# Patient Record
Sex: MALE | Race: WHITE | URBAN - METROPOLITAN AREA
[De-identification: names, ages, dates, MRNs, and addresses within clinical notes are randomized per-mention and may not be internally consistent; named-entity substitution may affect disease eponyms.]

---

## 2020-07-09 ENCOUNTER — TELEPHONE ENCOUNTER (OUTPATIENT)
Dept: URBAN - METROPOLITAN AREA CLINIC 23 | Facility: CLINIC | Age: 25
End: 2020-07-09

## 2020-09-03 ENCOUNTER — OFFICE VISIT (OUTPATIENT)
Dept: URBAN - METROPOLITAN AREA CLINIC 78 | Facility: CLINIC | Age: 25
End: 2020-09-03
Payer: COMMERCIAL

## 2020-09-03 DIAGNOSIS — K51.90 ULCERATIVE COLITIS: ICD-10-CM

## 2020-09-03 DIAGNOSIS — R12 HEARTBURN: ICD-10-CM

## 2020-09-03 PROCEDURE — G8417 CALC BMI ABV UP PARAM F/U: HCPCS | Performed by: INTERNAL MEDICINE

## 2020-09-03 PROCEDURE — G9903 PT SCRN TBCO ID AS NON USER: HCPCS | Performed by: INTERNAL MEDICINE

## 2020-09-03 PROCEDURE — 99214 OFFICE O/P EST MOD 30 MIN: CPT | Performed by: INTERNAL MEDICINE

## 2020-09-03 PROCEDURE — G8427 DOCREV CUR MEDS BY ELIG CLIN: HCPCS | Performed by: INTERNAL MEDICINE

## 2020-09-03 RX ORDER — SODIUM, POTASSIUM,MAG SULFATES 17.5-3.13G
354 ML SOLUTION, RECONSTITUTED, ORAL ORAL
Qty: 1 | Refills: 0 | OUTPATIENT
Start: 2020-09-03

## 2020-09-03 RX ORDER — PREDNISONE 10 MG/1
TAKE 4 TABLET BY ORAL ROUTE QD FOR 5 DAYS, THEN 3 TABLETS DAILY FOR 7 DAYS, THEN 2 TABLETS DAILY FOR 7 DAYS, THEN 1 TABLET DAILY FOR 4 DAYS THEN 0.5 TABLETS DAILY FOR 3 DAYS, THEN STOP TABLET ORAL 1
Qty: 70 | Refills: 0 | Status: ON HOLD | COMMUNITY
Start: 2019-03-26

## 2020-09-03 RX ORDER — MESALAMINE 1.2 G/1
2 TABLETS TABLET, DELAYED RELEASE ORAL ONCE A DAY
Qty: 180 | Refills: 2 | OUTPATIENT
Start: 2020-09-03 | End: 2020-12-01

## 2020-09-03 RX ORDER — MESALAMINE 1.2 G/1
TAKE 4 TABLETS (4.8 GRAM) BY ORAL ROUTE ONCE DAILY WITH A MEAL TABLET, DELAYED RELEASE ORAL 1
Qty: 360 | Refills: 3 | Status: ACTIVE | COMMUNITY
Start: 2020-01-06 | End: 2020-12-31

## 2020-09-03 RX ORDER — METHYLPREDNISOLONE 4 MG
TAKE AS DIRECTED FOR 6 DAYS TABLET, DOSE PACK ORAL
Qty: 1 | Refills: 0 | Status: ON HOLD | COMMUNITY
Start: 2018-02-15

## 2020-09-03 RX ORDER — OMEPRAZOLE 40 MG/1
TAKE 1 CAPSULE (40 MG) BY ORAL ROUTE ONCE DAILY 30 MINUTES BEFORE BREAKFAST CAPSULE, DELAYED RELEASE PELLETS ORAL 1
Qty: 30 | Refills: 4 | Status: ACTIVE | COMMUNITY
Start: 2019-10-16

## 2020-09-03 RX ORDER — MESALAMINE 1.2 G/1
TAKE 4 TABLETS (4.8 GRAM) BY ORAL ROUTE ONCE DAILY WITH A MEAL TABLET, DELAYED RELEASE ORAL 1
Qty: 120 | Refills: 6 | Status: ACTIVE | COMMUNITY
Start: 2018-07-10

## 2020-11-04 ENCOUNTER — CLAIMS CREATED FROM THE CLAIM WINDOW (OUTPATIENT)
Dept: URBAN - METROPOLITAN AREA CLINIC 4 | Facility: CLINIC | Age: 25
End: 2020-11-04
Payer: COMMERCIAL

## 2020-11-04 ENCOUNTER — OFFICE VISIT (OUTPATIENT)
Dept: URBAN - METROPOLITAN AREA SURGERY CENTER 15 | Facility: SURGERY CENTER | Age: 25
End: 2020-11-04
Payer: COMMERCIAL

## 2020-11-04 DIAGNOSIS — K51.80 OTHER ULCERATIVE COLITIS WITHOUT COMPLICATIONS: ICD-10-CM

## 2020-11-04 DIAGNOSIS — K51.00 CHRONIC PANCOLONIC ULCERATIVE COLITIS: ICD-10-CM

## 2020-11-04 DIAGNOSIS — K62.89 ANAL BURNING: ICD-10-CM

## 2020-11-04 DIAGNOSIS — K51.40 INFLAMMATORY POLYPS OF COLON WITHOUT COMPLICATIONS: ICD-10-CM

## 2020-11-04 PROCEDURE — 45380 COLONOSCOPY AND BIOPSY: CPT | Performed by: INTERNAL MEDICINE

## 2020-11-04 PROCEDURE — G8907 PT DOC NO EVENTS ON DISCHARG: HCPCS | Performed by: INTERNAL MEDICINE

## 2020-11-04 PROCEDURE — 88305 TISSUE EXAM BY PATHOLOGIST: CPT | Performed by: PATHOLOGY

## 2020-11-04 PROCEDURE — G9937 DIG OR SURV COLSCO: HCPCS | Performed by: INTERNAL MEDICINE

## 2020-11-04 RX ORDER — METHYLPREDNISOLONE 4 MG
TAKE AS DIRECTED FOR 6 DAYS TABLET, DOSE PACK ORAL
Qty: 1 | Refills: 0 | Status: ON HOLD | COMMUNITY
Start: 2018-02-15

## 2020-11-04 RX ORDER — PREDNISONE 10 MG/1
TAKE 4 TABLET BY ORAL ROUTE QD FOR 5 DAYS, THEN 3 TABLETS DAILY FOR 7 DAYS, THEN 2 TABLETS DAILY FOR 7 DAYS, THEN 1 TABLET DAILY FOR 4 DAYS THEN 0.5 TABLETS DAILY FOR 3 DAYS, THEN STOP TABLET ORAL 1
Qty: 70 | Refills: 0 | Status: ON HOLD | COMMUNITY
Start: 2019-03-26

## 2020-11-04 RX ORDER — MESALAMINE 1.2 G/1
2 TABLETS TABLET, DELAYED RELEASE ORAL ONCE A DAY
Qty: 180 | Refills: 2 | Status: ACTIVE | COMMUNITY
Start: 2020-09-03 | End: 2020-12-01

## 2020-11-04 RX ORDER — MESALAMINE 1.2 G/1
TAKE 4 TABLETS (4.8 GRAM) BY ORAL ROUTE ONCE DAILY WITH A MEAL TABLET, DELAYED RELEASE ORAL 1
Qty: 360 | Refills: 3 | Status: ACTIVE | COMMUNITY
Start: 2020-01-06 | End: 2020-12-31

## 2020-11-04 RX ORDER — SODIUM, POTASSIUM,MAG SULFATES 17.5-3.13G
354 ML SOLUTION, RECONSTITUTED, ORAL ORAL
Qty: 1 | Refills: 0 | Status: ACTIVE | COMMUNITY
Start: 2020-09-03

## 2020-11-04 RX ORDER — MESALAMINE 1.2 G/1
TAKE 4 TABLETS (4.8 GRAM) BY ORAL ROUTE ONCE DAILY WITH A MEAL TABLET, DELAYED RELEASE ORAL 1
Qty: 120 | Refills: 6 | Status: ACTIVE | COMMUNITY
Start: 2018-07-10

## 2020-11-04 RX ORDER — OMEPRAZOLE 40 MG/1
TAKE 1 CAPSULE (40 MG) BY ORAL ROUTE ONCE DAILY 30 MINUTES BEFORE BREAKFAST CAPSULE, DELAYED RELEASE PELLETS ORAL 1
Qty: 30 | Refills: 4 | Status: ACTIVE | COMMUNITY
Start: 2019-10-16

## 2021-01-04 ENCOUNTER — OFFICE VISIT (OUTPATIENT)
Dept: URBAN - METROPOLITAN AREA CLINIC 78 | Facility: CLINIC | Age: 26
End: 2021-01-04
Payer: COMMERCIAL

## 2021-01-04 ENCOUNTER — LAB OUTSIDE AN ENCOUNTER (OUTPATIENT)
Dept: URBAN - METROPOLITAN AREA CLINIC 78 | Facility: CLINIC | Age: 26
End: 2021-01-04

## 2021-01-04 VITALS
SYSTOLIC BLOOD PRESSURE: 129 MMHG | HEART RATE: 78 BPM | BODY MASS INDEX: 32.89 KG/M2 | DIASTOLIC BLOOD PRESSURE: 87 MMHG | WEIGHT: 242.8 LBS | TEMPERATURE: 97.4 F | HEIGHT: 72 IN

## 2021-01-04 DIAGNOSIS — K51.90 ULCERATIVE COLITIS: ICD-10-CM

## 2021-01-04 DIAGNOSIS — R12 HEARTBURN: ICD-10-CM

## 2021-01-04 PROCEDURE — G8482 FLU IMMUNIZE ORDER/ADMIN: HCPCS | Performed by: INTERNAL MEDICINE

## 2021-01-04 PROCEDURE — 99214 OFFICE O/P EST MOD 30 MIN: CPT | Performed by: INTERNAL MEDICINE

## 2021-01-04 PROCEDURE — G8427 DOCREV CUR MEDS BY ELIG CLIN: HCPCS | Performed by: INTERNAL MEDICINE

## 2021-01-04 PROCEDURE — G9903 PT SCRN TBCO ID AS NON USER: HCPCS | Performed by: INTERNAL MEDICINE

## 2021-01-04 PROCEDURE — G8417 CALC BMI ABV UP PARAM F/U: HCPCS | Performed by: INTERNAL MEDICINE

## 2021-01-04 RX ORDER — MESALAMINE 1.2 G/1
1 CAPSULE TABLET, DELAYED RELEASE ORAL ONCE A DAY
Qty: 90 | Refills: 2 | OUTPATIENT
Start: 2021-01-04 | End: 2021-04-04

## 2021-01-04 RX ORDER — PREDNISONE 10 MG/1
TAKE 4 TABLET BY ORAL ROUTE QD FOR 5 DAYS, THEN 3 TABLETS DAILY FOR 7 DAYS, THEN 2 TABLETS DAILY FOR 7 DAYS, THEN 1 TABLET DAILY FOR 4 DAYS THEN 0.5 TABLETS DAILY FOR 3 DAYS, THEN STOP TABLET ORAL 1
Qty: 70 | Refills: 0 | Status: ON HOLD | COMMUNITY
Start: 2019-03-26

## 2021-01-04 RX ORDER — OMEPRAZOLE 40 MG/1
TAKE 1 CAPSULE (40 MG) BY ORAL ROUTE ONCE DAILY 30 MINUTES BEFORE BREAKFAST CAPSULE, DELAYED RELEASE PELLETS ORAL 1
Qty: 30 | Refills: 4 | Status: ACTIVE | COMMUNITY
Start: 2019-10-16

## 2021-01-04 RX ORDER — MESALAMINE 1.2 G/1
TAKE 4 TABLETS (4.8 GRAM) BY ORAL ROUTE ONCE DAILY WITH A MEAL TABLET, DELAYED RELEASE ORAL 1
OUTPATIENT
Start: 2018-07-10

## 2021-01-04 RX ORDER — SODIUM, POTASSIUM,MAG SULFATES 17.5-3.13G
354 ML SOLUTION, RECONSTITUTED, ORAL ORAL
Qty: 1 | Refills: 0 | Status: ACTIVE | COMMUNITY
Start: 2020-09-03

## 2021-01-04 RX ORDER — METHYLPREDNISOLONE 4 MG
TAKE AS DIRECTED FOR 6 DAYS TABLET, DOSE PACK ORAL
Qty: 1 | Refills: 0 | Status: ON HOLD | COMMUNITY
Start: 2018-02-15

## 2021-01-04 RX ORDER — MESALAMINE 1.2 G/1
TAKE 4 TABLETS (4.8 GRAM) BY ORAL ROUTE ONCE DAILY WITH A MEAL TABLET, DELAYED RELEASE ORAL 1
Qty: 120 | Refills: 6 | Status: ACTIVE | COMMUNITY
Start: 2018-07-10

## 2021-01-04 NOTE — HPI-TODAY'S VISIT:
I have been following the patient for Ulcerative colitis.  I started him on Humira on 4/12/19. He has not had any side effects. He is also on Mesalamine - now down to 3 tablets daily. He has been compliant with this. He has been off Mercaptopurine completely for a few months now.  He is having 1 soft, formed non bloody BM daily. Appetite has been good. No abdominal pain. No fevers or chills. No nausea or vomiting. His GERD has been well-controlled using Omeprazole prn. He has not been using the Citrucel anymore as he felt that he was not struggling with constipation anymore. He has been intentionally losing weight by increasing his exercise routine and avoiding eating out. He has lost 20lbs.   I had instructed him to increased the Humira injections to once weekly. He continues to feel great. Denies any new complaints or AE to the meds.    He just had bloodwork done via his PCP's office.   Summary of prior workup: - Labs on 12/3/20: Na 139, K 4.0, Cl 101, Gluc 87, BUN 15, C r 0.9, TP 7.9, Alb 4.6, TB 0.7, Ca 9.7, ALT 33, AST 21, AP 90. HbA1c 5.4%, , TSH 1.28, WBC 9.3, Hb 17.2, MCV 90, Plts 327.  - Colonoscopy by me on 11/4/20: Normal mucosa thorughout the colon except for mild patchy erythema in the sigm and rectum, as well as a small inflammatory pseudopolyp removed from the rectum. Bxs consistent with inactive colitis.   - Bloodwork on 4/18/20 revealed a glucose of 97, BUN 11, creatinine 0.8, sodium 142, potassium 4.2, calcium 9.7, TP 7.9, albumin 4.9, TB 0.7, AP slightly elevated at 123, AST 29, ALT 29. WBC 8.9, hemoglobin 17.6, MCV 89, platelets 354, % saturation 31, normal Fe, negative viral hepatitis panel, negative TB test. Vitamin D low at 17.9. B12 323, ferritin 70, CRP 2. - Bloodwork on 1/17/20 revealed an Adlimumab level of 3.2 while Adalimumab antibody was undetectable. - Colonoscopy by me on 12/11/19 revealed a normal terminal ileum and overall normal mucosa throughout the descending, splenic flexure, transverse, hepatic flexure, ascending and cecum. Biopsies were consistent with ongoing mild active colitis. There was evidence of proctosigmoiditis and a few polyps which were noted to be inflammatory and hyperplastic in nature. - Labs on 7/27/19 revealed a glucose of 96, BUN 11, creatinine 0.7, normal electrolytes except for a CO2 of 19. Calcium 9.6, TP 8.0, albumin 4.8, TB 0.9, AP 85, AST 14, ALT 22. WBC 7.2, hemoglobin 17.1, MCV 95, platelets 275. TB negative. ESR 23. CRP 4. Negtive UA on 7/19/19. - Labs on 3/26/19 revealed a normal CMP &CBC, negative hepatitis panel, QuantiFERON-TB gold test could not be run as the sample was insufficient. TSH 2.57. Positive immunity to hepatitis B but not to hepatitis A. CRP normal at 4.3 however ESR was elevated at 20. - GI PCR panel on 3/11/19 was negative.  - Colonoscopy by me on 12/26/18 revealed a normal appearing mucosa throughout without erythema, ulcerations or blood. Random biopsies were consistent with chronic inactive colitis. Around the appendiceal orifice there was some nodularity. Biopsies were unremarkable. There were 2 polyps in the descending colon and 2 polyps in the sigmoid. Path results were consistent with lymphoid aggreagates and HP polyps.  - Labs on 10/1/18 revealed his sodium of 141, potassium 3.9, glucose 79, be you and 16, creatinine 0.8, TP 8.2, albumin 4.9, tea be sure. Seven, ALT 26, AP 83, AST 16, HbA1c 5%. Triglycerides 132, LDL 41, HDL 38. TSH 1.8. White blood cell count 6.9, hemoglobin 16.6, MCV 98, platelet count 322,000. UA On 8/10/18 was negative. - GI PCR panel on 6/27/18 was negative. - C diff negative on 2/27/18. - Colonoscopy by Dr. Montesinos on 12/11/17 showed mild pancolitis extending from the TI to the rectum. TI biopsies were normal. Random colon biopsies revealed mild focal active colitis. - Labs on 11/4/17 disclosed a normal CBC except for an MCV of 99, normal CMP and TPMT of 20.5

## 2021-01-04 NOTE — PHYSICAL EXAM LYMPHATIC:
Neck , no lymphadenopathy , Neck , no lymphadenopathy , Neck , no lymphadenopathy , Neck , no lymphadenopathy

## 2021-01-04 NOTE — PHYSICAL EXAM SKIN:
no rashes , no suspicious lesions , no areas of discoloration , no jaundice present , good turgor , no masses , no tenderness on palpation , no rashes , no suspicious lesions , no areas of discoloration , no jaundice present , good turgor , no masses , no tenderness on palpation , no rashes , no suspicious lesions , no areas of discoloration , no jaundice present , good turgor , no masses , no tenderness on palpation , no rashes , no suspicious lesions , no areas of discoloration , no jaundice present , good turgor , no masses , no tenderness on palpation

## 2021-01-04 NOTE — PHYSICAL EXAM CONSTITUTIONAL:
well developed, well nourished , in no acute distress , ambulating without difficulty , normal communication ability , well developed, well nourished , in no acute distress , ambulating without difficulty , normal communication ability , well developed, well nourished , in no acute distress , ambulating without difficulty , normal communication ability , well developed, well nourished , in no acute distress , ambulating without difficulty , normal communication ability , well developed, well nourished , in no acute distress , ambulating without difficulty , normal communication ability , well developed, well nourished , in no acute distress , ambulating without difficulty , normal communication ability , well developed, well nourished , in no acute distress , ambulating without difficulty , normal communication ability , well developed, well nourished , in no acute distress , ambulating without difficulty , normal communication ability

## 2021-01-04 NOTE — PHYSICAL EXAM EYES:
Conjuntivae and eyelids appear normal, Sclerae : White without injection , Conjuntivae and eyelids appear normal, Sclerae : White without injection , Conjuntivae and eyelids appear normal, Sclerae : White without injection , Conjuntivae and eyelids appear normal, Sclerae : White without injection , Conjuntivae and eyelids appear normal, Sclerae : White without injection , Conjuntivae and eyelids appear normal, Sclerae : White without injection , Conjuntivae and eyelids appear normal, Sclerae : White without injection , Conjuntivae and eyelids appear normal, Sclerae : White without injection

## 2021-01-04 NOTE — PHYSICAL EXAM PSYCH:
normal mood with appropriate affect , normal mood with appropriate affect , normal mood with appropriate affect , normal mood with appropriate affect

## 2021-01-04 NOTE — PHYSICAL EXAM CHEST:
no lesions,  no deformities,  no traumatic injuries,  no significant scars are present,  chest wall non-tender,  no masses present, breathing is unlabored without accessory muscle use,normal breath sounds , no lesions,  no deformities,  no traumatic injuries,  no significant scars are present,  chest wall non-tender,  no masses present, breathing is unlabored without accessory muscle use, normal breath sounds , no lesions,  no deformities,  no traumatic injuries,  no significant scars are present,  chest wall non-tender,  no masses present, breathing is unlabored without accessory muscle use,normal breath sounds , no lesions,  no deformities,  no traumatic injuries,  no significant scars are present,  chest wall non-tender,  no masses present, breathing is unlabored without accessory muscle use, normal breath sounds , no lesions,  no deformities,  no traumatic injuries,  no significant scars are present,  chest wall non-tender,  no masses present, breathing is unlabored without accessory muscle use,normal breath sounds , no lesions,  no deformities,  no traumatic injuries,  no significant scars are present,  chest wall non-tender,  no masses present, breathing is unlabored without accessory muscle use, normal breath sounds , no lesions,  no deformities,  no traumatic injuries,  no significant scars are present,  chest wall non-tender,  no masses present, breathing is unlabored without accessory muscle use,normal breath sounds , no lesions,  no deformities,  no traumatic injuries,  no significant scars are present,  chest wall non-tender,  no masses present, breathing is unlabored without accessory muscle use, normal breath sounds

## 2021-01-04 NOTE — PHYSICAL EXAM NEUROLOGIC:
oriented to person, place and time , normal sensation , short and long term memory intact , oriented to person, place and time , normal sensation , short and long term memory intact , oriented to person, place and time , normal sensation , short and long term memory intact , oriented to person, place and time , normal sensation , short and long term memory intact

## 2021-01-04 NOTE — PHYSICAL EXAM MUSCULOSKELETAL:
normal gait and station , no tenderness or deformities present , normal gait and station , no tenderness or deformities present , normal gait and station , no tenderness or deformities present , normal gait and station , no tenderness or deformities present

## 2021-01-04 NOTE — PHYSICAL EXAM NECK/THYROID:
normal appearance , without tenderness upon palpation , no deformities , trachea midline , Thyroid normal size , no thyroid nodules , no masses , no JVD , thyroid nontender , normal appearance , without tenderness upon palpation , no deformities , trachea midline , Thyroid normal size , no thyroid nodules , no masses , no JVD , thyroid nontender , normal appearance , without tenderness upon palpation , no deformities , trachea midline , Thyroid normal size , no thyroid nodules , no masses , no JVD , thyroid nontender , normal appearance , without tenderness upon palpation , no deformities , trachea midline , Thyroid normal size , no thyroid nodules , no masses , no JVD , thyroid nontender , normal appearance , without tenderness upon palpation , no deformities , trachea midline , Thyroid normal size , no thyroid nodules , no masses , no JVD , thyroid nontender , normal appearance , without tenderness upon palpation , no deformities , trachea midline , Thyroid normal size , no thyroid nodules , no masses , no JVD , thyroid nontender , normal appearance , without tenderness upon palpation , no deformities , trachea midline , Thyroid normal size , no thyroid nodules , no masses , no JVD , thyroid nontender , normal appearance , without tenderness upon palpation , no deformities , trachea midline , Thyroid normal size , no thyroid nodules , no masses , no JVD , thyroid nontender

## 2021-01-04 NOTE — PHYSICAL EXAM CARDIOVASCULAR:
no edema, no murmurs, regular rate and rhythm , no edema, no murmurs, regular rate and rhythm , no edema, no murmurs, regular rate and rhythm , no edema, no murmurs, regular rate and rhythm , no edema, no murmurs, regular rate and rhythm , no edema, no murmurs, regular rate and rhythm , no edema, no murmurs, regular rate and rhythm , no edema, no murmurs, regular rate and rhythm

## 2021-01-04 NOTE — PHYSICAL EXAM GASTROINTESTINAL
Abdomen , soft, nontender, nondistended , no guarding or rigidity , no masses palpable , normal bowel sounds , Liver and Spleen , no hepatomegaly present , no hepatosplenomegaly , liver nontender , spleen not palpable , Abdomen , soft, nontender, nondistended , no guarding or rigidity , no masses palpable , normal bowel sounds , Liver and Spleen , no hepatomegaly present , no hepatosplenomegaly , liver nontender , spleen not palpable , Abdomen , soft, nontender, nondistended , no guarding or rigidity , no masses palpable , normal bowel sounds , Liver and Spleen , no hepatomegaly present , no hepatosplenomegaly , liver nontender , spleen not palpable , Abdomen , soft, nontender, nondistended , no guarding or rigidity , no masses palpable , normal bowel sounds , Liver and Spleen , no hepatomegaly present , no hepatosplenomegaly , liver nontender , spleen not palpable , Abdomen , soft, nontender, nondistended , no guarding or rigidity , no masses palpable , normal bowel sounds , Liver and Spleen , no hepatomegaly present , no hepatosplenomegaly , liver nontender , spleen not palpable , Abdomen , soft, nontender, nondistended , no guarding or rigidity , no masses palpable , normal bowel sounds , Liver and Spleen , no hepatomegaly present , no hepatosplenomegaly , liver nontender , spleen not palpable , Abdomen , soft, nontender, nondistended , no guarding or rigidity , no masses palpable , normal bowel sounds , Liver and Spleen , no hepatomegaly present , no hepatosplenomegaly , liver nontender , spleen not palpable , Abdomen , soft, nontender, nondistended , no guarding or rigidity , no masses palpable , normal bowel sounds , Liver and Spleen , no hepatomegaly present , no hepatosplenomegaly , liver nontender , spleen not palpable

## 2021-01-04 NOTE — PHYSICAL EXAM HENT:
Head, normocephalic, atraumatic, Face, Face within normal limits, Ears, External ears within normal limits, Nose/Nasopharynx, External nose  normal appearance, nares patent, no nasal discharge, Mouth and Throat, Oral cavity appearance normal, Breath odor normal, Lips, Appearance normal , Head, normocephalic, atraumatic, Face, Face within normal limits, Ears, External ears within normal limits, Nose/Nasopharynx, External nose  normal appearance, nares patent, no nasal discharge, Mouth and Throat, Oral cavity appearance normal, Breath odor normal, Lips, Appearance normal , Head, normocephalic, atraumatic, Face, Face within normal limits, Ears, External ears within normal limits, Nose/Nasopharynx, External nose  normal appearance, nares patent, no nasal discharge, Mouth and Throat, Oral cavity appearance normal, Breath odor normal, Lips, Appearance normal , Head, normocephalic, atraumatic, Face, Face within normal limits, Ears, External ears within normal limits, Nose/Nasopharynx, External nose  normal appearance, nares patent, no nasal discharge, Mouth and Throat, Oral cavity appearance normal, Breath odor normal, Lips, Appearance normal , Head, normocephalic, atraumatic, Face, Face within normal limits, Ears, External ears within normal limits, Nose/Nasopharynx, External nose  normal appearance, nares patent, no nasal discharge, Mouth and Throat, Oral cavity appearance normal, Breath odor normal, Lips, Appearance normal , Head, normocephalic, atraumatic, Face, Face within normal limits, Ears, External ears within normal limits, Nose/Nasopharynx, External nose  normal appearance, nares patent, no nasal discharge, Mouth and Throat, Oral cavity appearance normal, Breath odor normal, Lips, Appearance normal , Head, normocephalic, atraumatic, Face, Face within normal limits, Ears, External ears within normal limits, Nose/Nasopharynx, External nose  normal appearance, nares patent, no nasal discharge, Mouth and Throat, Oral cavity appearance normal, Breath odor normal, Lips, Appearance normal , Head, normocephalic, atraumatic, Face, Face within normal limits, Ears, External ears within normal limits, Nose/Nasopharynx, External nose  normal appearance, nares patent, no nasal discharge, Mouth and Throat, Oral cavity appearance normal, Breath odor normal, Lips, Appearance normal

## 2021-01-08 LAB
C-REACTIVE PROTEIN, QUANT: 2
HBSAG SCREEN: NEGATIVE
HEP A AB, IGM: NEGATIVE
HEP B CORE AB, IGM: NEGATIVE
HEP C VIRUS AB: <0.1
QUANTIFERON CRITERIA: (no result)
QUANTIFERON INCUBATION: (no result)
QUANTIFERON MITOGEN VALUE: >10
QUANTIFERON NIL VALUE: 0.05
QUANTIFERON TB1 AG VALUE: 0.05
QUANTIFERON TB2 AG VALUE: 0.05
QUANTIFERON-TB GOLD PLUS: NEGATIVE
SEDIMENTATION RATE-WESTERGREN: 9

## 2021-03-09 ENCOUNTER — TELEPHONE ENCOUNTER (OUTPATIENT)
Dept: URBAN - METROPOLITAN AREA CLINIC 78 | Facility: CLINIC | Age: 26
End: 2021-03-09

## 2021-03-09 RX ORDER — OMEPRAZOLE 40 MG/1
TAKE 1 CAPSULE (40 MG) BY ORAL ROUTE ONCE DAILY 30 MINUTES BEFORE BREAKFAST CAPSULE, DELAYED RELEASE PELLETS ORAL 1
Qty: 30 | Refills: 4
Start: 2019-10-16

## 2021-03-25 ENCOUNTER — TELEPHONE ENCOUNTER (OUTPATIENT)
Dept: URBAN - METROPOLITAN AREA CLINIC 96 | Facility: CLINIC | Age: 26
End: 2021-03-25

## 2021-06-07 ENCOUNTER — OFFICE VISIT (OUTPATIENT)
Dept: URBAN - METROPOLITAN AREA CLINIC 78 | Facility: CLINIC | Age: 26
End: 2021-06-07
Payer: COMMERCIAL

## 2021-06-07 DIAGNOSIS — R14.0 GASSINESS: ICD-10-CM

## 2021-06-07 DIAGNOSIS — K51.90 ULCERATIVE COLITIS: ICD-10-CM

## 2021-06-07 DIAGNOSIS — R12 HEARTBURN: ICD-10-CM

## 2021-06-07 PROCEDURE — 99214 OFFICE O/P EST MOD 30 MIN: CPT | Performed by: INTERNAL MEDICINE

## 2021-06-07 RX ORDER — SODIUM, POTASSIUM,MAG SULFATES 17.5-3.13G
354 ML SOLUTION, RECONSTITUTED, ORAL ORAL
Qty: 1 | Refills: 0 | Status: ON HOLD | COMMUNITY
Start: 2020-09-03

## 2021-06-07 RX ORDER — METHYLPREDNISOLONE 4 MG
TAKE AS DIRECTED FOR 6 DAYS TABLET, DOSE PACK ORAL
Qty: 1 | Refills: 0 | Status: ON HOLD | COMMUNITY
Start: 2018-02-15

## 2021-06-07 RX ORDER — PREDNISONE 10 MG/1
TAKE 4 TABLET BY ORAL ROUTE QD FOR 5 DAYS, THEN 3 TABLETS DAILY FOR 7 DAYS, THEN 2 TABLETS DAILY FOR 7 DAYS, THEN 1 TABLET DAILY FOR 4 DAYS THEN 0.5 TABLETS DAILY FOR 3 DAYS, THEN STOP TABLET ORAL 1
Qty: 70 | Refills: 0 | Status: ON HOLD | COMMUNITY
Start: 2019-03-26

## 2021-06-07 RX ORDER — SODIUM PICOSULFATE, MAGNESIUM OXIDE, AND ANHYDROUS CITRIC ACID 10; 3.5; 12 MG/160ML; G/160ML; G/160ML
160ML AS DIRECTED LIQUID ORAL ONCE
Qty: 1 | Refills: 0 | OUTPATIENT
Start: 2021-06-07 | End: 2021-06-08

## 2021-06-07 RX ORDER — OMEPRAZOLE 40 MG/1
TAKE 1 CAPSULE (40 MG) BY ORAL ROUTE ONCE DAILY 30 MINUTES BEFORE BREAKFAST CAPSULE, DELAYED RELEASE PELLETS ORAL 1
Qty: 30 | Refills: 4 | Status: ACTIVE | COMMUNITY
Start: 2019-10-16

## 2021-06-07 NOTE — HPI-TODAY'S VISIT:
I have been following the patient for Ulcerative colitis.  I started him on Humira on 4/12/19. He takes his injections once weeklyHe has not had any side effects. We have been able to successfully wean him off Mesalamine without any evidence of flares or decline in his overall health. He has been off Mercaptopurine completely for months  He is having 1 soft, formed non bloody BM daily. Appetite has been good. No abdominal pain. No fevers or chills. No nausea or vomiting. His GERD has been well-controlled using Omeprazole prn. He has not been using the Citrucel anymore as he felt that he was not struggling with constipation anymore.  He comes in today as he had been having increased flatulence and burping. This was quite prominent for a week or two but has since gotten better. He was eating out a bit more. He did not have any associated s/s of a flare while the increased gasiness was present. His weight has been fluctuating.   He has received both his COVID vaccines. After the first shot, he did get COVID manifesting with cough, abnormal tase, and fevers. He did not have any GI symptoms.   Otherwise he continues to feel great. Denies any new complaints or AE to the meds.    Summary of prior workup: - Labs on 12/3/20: Na 139, K 4.0, Cl 101, Gluc 87, BUN 15, C r 0.9, TP 7.9, Alb 4.6, TB 0.7, Ca 9.7, ALT 33, AST 21, AP 90. HbA1c 5.4%, , TSH 1.28, WBC 9.3, Hb 17.2, MCV 90, Plts 327.  - Colonoscopy by me on 11/4/20: Normal mucosa thorughout the colon except for mild patchy erythema in the sigm and rectum, as well as a small inflammatory pseudopolyp removed from the rectum. Bxs consistent with inactive colitis.   - Bloodwork on 4/18/20 revealed a glucose of 97, BUN 11, creatinine 0.8, sodium 142, potassium 4.2, calcium 9.7, TP 7.9, albumin 4.9, TB 0.7, AP slightly elevated at 123, AST 29, ALT 29. WBC 8.9, hemoglobin 17.6, MCV 89, platelets 354, % saturation 31, normal Fe, negative viral hepatitis panel, negative TB test. Vitamin D low at 17.9. B12 323, ferritin 70, CRP 2. - Bloodwork on 1/17/20 revealed an Adlimumab level of 3.2 while Adalimumab antibody was undetectable. - Colonoscopy by me on 12/11/19 revealed a normal terminal ileum and overall normal mucosa throughout the descending, splenic flexure, transverse, hepatic flexure, ascending and cecum. Biopsies were consistent with ongoing mild active colitis. There was evidence of proctosigmoiditis and a few polyps which were noted to be inflammatory and hyperplastic in nature. - Labs on 7/27/19 revealed a glucose of 96, BUN 11, creatinine 0.7, normal electrolytes except for a CO2 of 19. Calcium 9.6, TP 8.0, albumin 4.8, TB 0.9, AP 85, AST 14, ALT 22. WBC 7.2, hemoglobin 17.1, MCV 95, platelets 275. TB negative. ESR 23. CRP 4. Negtive UA on 7/19/19. - Labs on 3/26/19 revealed a normal CMP &CBC, negative hepatitis panel, QuantiFERON-TB gold test could not be run as the sample was insufficient. TSH 2.57. Positive immunity to hepatitis B but not to hepatitis A. CRP normal at 4.3 however ESR was elevated at 20. - GI PCR panel on 3/11/19 was negative.  - Colonoscopy by me on 12/26/18 revealed a normal appearing mucosa throughout without erythema, ulcerations or blood. Random biopsies were consistent with chronic inactive colitis. Around the appendiceal orifice there was some nodularity. Biopsies were unremarkable. There were 2 polyps in the descending colon and 2 polyps in the sigmoid. Path results were consistent with lymphoid aggreagates and HP polyps.  - Labs on 10/1/18 revealed his sodium of 141, potassium 3.9, glucose 79, be you and 16, creatinine 0.8, TP 8.2, albumin 4.9, tea be sure. Seven, ALT 26, AP 83, AST 16, HbA1c 5%. Triglycerides 132, LDL 41, HDL 38. TSH 1.8. White blood cell count 6.9, hemoglobin 16.6, MCV 98, platelet count 322,000. UA On 8/10/18 was negative. - GI PCR panel on 6/27/18 was negative. - C diff negative on 2/27/18. - Colonoscopy by Dr. Montesinos on 12/11/17 showed mild pancolitis extending from the TI to the rectum. TI biopsies were normal. Random colon biopsies revealed mild focal active colitis. - Labs on 11/4/17 disclosed a normal CBC except for an MCV of 99, normal CMP and TPMT of 20.5

## 2021-06-15 LAB
A/G RATIO: 1.6
ALBUMIN: 4.9
ALKALINE PHOSPHATASE: 99
ALT (SGPT): 26
AST (SGOT): 17
BILIRUBIN, TOTAL: 0.6
BUN/CREATININE RATIO: 14
BUN: 11
CALCIUM: 9.5
CARBON DIOXIDE, TOTAL: 24
CHLORIDE: 103
CREATININE: 0.78
EGFR IF AFRICN AM: 144
EGFR IF NONAFRICN AM: 125
GLOBULIN, TOTAL: 3
GLUCOSE: 98
HBSAG SCREEN: NEGATIVE
HEMATOCRIT: 51.7
HEMOGLOBIN: 17.4
HEP A AB, IGM: NEGATIVE
HEP B CORE AB, IGM: NEGATIVE
HEP C VIRUS AB: <0.1
MCH: 30.4
MCHC: 33.7
MCV: 90
NRBC: (no result)
PLATELETS: 322
POTASSIUM: 4.5
PROTEIN, TOTAL: 7.9
QUANTIFERON CRITERIA: (no result)
QUANTIFERON INCUBATION: (no result)
QUANTIFERON MITOGEN VALUE: >10
QUANTIFERON NIL VALUE: 0
QUANTIFERON TB1 AG VALUE: 0.02
QUANTIFERON TB2 AG VALUE: 0
QUANTIFERON-TB GOLD PLUS: NEGATIVE
RBC: 5.72
RDW: 12.8
SODIUM: 141
WBC: 8.2

## 2021-10-19 ENCOUNTER — TELEPHONE ENCOUNTER (OUTPATIENT)
Dept: URBAN - METROPOLITAN AREA CLINIC 78 | Facility: CLINIC | Age: 26
End: 2021-10-19

## 2021-11-05 ENCOUNTER — CLAIMS CREATED FROM THE CLAIM WINDOW (OUTPATIENT)
Dept: URBAN - METROPOLITAN AREA CLINIC 4 | Facility: CLINIC | Age: 26
End: 2021-11-05
Payer: COMMERCIAL

## 2021-11-05 ENCOUNTER — OFFICE VISIT (OUTPATIENT)
Dept: URBAN - METROPOLITAN AREA SURGERY CENTER 15 | Facility: SURGERY CENTER | Age: 26
End: 2021-11-05
Payer: COMMERCIAL

## 2021-11-05 DIAGNOSIS — K51.00 ACUTE ULCERATIVE PANCOLITIS: ICD-10-CM

## 2021-11-05 DIAGNOSIS — K51.80 OTHER ULCERATIVE COLITIS WITHOUT COMPLICATIONS: ICD-10-CM

## 2021-11-05 PROCEDURE — 88305 TISSUE EXAM BY PATHOLOGIST: CPT | Performed by: PATHOLOGY

## 2021-11-05 PROCEDURE — 45380 COLONOSCOPY AND BIOPSY: CPT | Performed by: INTERNAL MEDICINE

## 2021-11-05 PROCEDURE — G8907 PT DOC NO EVENTS ON DISCHARG: HCPCS | Performed by: INTERNAL MEDICINE

## 2021-11-05 RX ORDER — PREDNISONE 10 MG/1
TAKE 4 TABLET BY ORAL ROUTE QD FOR 5 DAYS, THEN 3 TABLETS DAILY FOR 7 DAYS, THEN 2 TABLETS DAILY FOR 7 DAYS, THEN 1 TABLET DAILY FOR 4 DAYS THEN 0.5 TABLETS DAILY FOR 3 DAYS, THEN STOP TABLET ORAL 1
Qty: 70 | Refills: 0 | Status: ON HOLD | COMMUNITY
Start: 2019-03-26

## 2021-11-05 RX ORDER — OMEPRAZOLE 40 MG/1
TAKE 1 CAPSULE (40 MG) BY ORAL ROUTE ONCE DAILY 30 MINUTES BEFORE BREAKFAST CAPSULE, DELAYED RELEASE PELLETS ORAL 1
Qty: 30 | Refills: 4 | Status: ACTIVE | COMMUNITY
Start: 2019-10-16

## 2021-11-05 RX ORDER — SODIUM, POTASSIUM,MAG SULFATES 17.5-3.13G
354 ML SOLUTION, RECONSTITUTED, ORAL ORAL
Qty: 1 | Refills: 0 | Status: ON HOLD | COMMUNITY
Start: 2020-09-03

## 2021-11-05 RX ORDER — METHYLPREDNISOLONE 4 MG
TAKE AS DIRECTED FOR 6 DAYS TABLET, DOSE PACK ORAL
Qty: 1 | Refills: 0 | Status: ON HOLD | COMMUNITY
Start: 2018-02-15

## 2022-01-03 ENCOUNTER — WEB ENCOUNTER (OUTPATIENT)
Dept: URBAN - METROPOLITAN AREA CLINIC 78 | Facility: CLINIC | Age: 27
End: 2022-01-03

## 2022-01-03 RX ORDER — ADALIMUMAB 40MG/0.4ML
INJECT 0.4 MILLILITER (40 MG) BY SUBCUTANEOUS ROUTE EVERY  WEEK IN THE ABDOMEN OR THIGH (ROTATE SITES) KIT SUBCUTANEOUS WEEKLY
Qty: 10 | Refills: 2

## 2022-01-26 ENCOUNTER — TELEPHONE ENCOUNTER (OUTPATIENT)
Dept: URBAN - METROPOLITAN AREA CLINIC 78 | Facility: CLINIC | Age: 27
End: 2022-01-26

## 2022-01-26 RX ORDER — OMEPRAZOLE 40 MG/1
TAKE 1 CAPSULE (40 MG) BY ORAL ROUTE ONCE DAILY 30 MINUTES BEFORE BREAKFAST CAPSULE, DELAYED RELEASE PELLETS ORAL 1
Qty: 30 | Refills: 4
Start: 2019-10-16

## 2022-02-01 ENCOUNTER — TELEPHONE ENCOUNTER (OUTPATIENT)
Dept: URBAN - METROPOLITAN AREA CLINIC 78 | Facility: CLINIC | Age: 27
End: 2022-02-01

## 2022-03-08 ENCOUNTER — TELEPHONE ENCOUNTER (OUTPATIENT)
Dept: URBAN - METROPOLITAN AREA CLINIC 78 | Facility: CLINIC | Age: 27
End: 2022-03-08

## 2022-04-01 ENCOUNTER — TELEPHONE ENCOUNTER (OUTPATIENT)
Dept: URBAN - METROPOLITAN AREA CLINIC 78 | Facility: CLINIC | Age: 27
End: 2022-04-01

## 2022-04-01 RX ORDER — MESALAMINE 1.2 G/1
3 TABLETS IN THE MORNING WITH A MEAL TABLET, DELAYED RELEASE ORAL ONCE A DAY
Qty: 90 TABLET | Refills: 1 | OUTPATIENT
Start: 2022-04-01 | End: 2022-05-31

## 2022-04-01 RX ORDER — MESALAMINE 4 G/60ML
1 ENEMA AT BEDTIME ENEMA RECTAL ONCE A DAY
Qty: 21 EACH | Refills: 1 | OUTPATIENT
Start: 2022-04-01 | End: 2022-05-31

## 2022-05-24 ENCOUNTER — TELEPHONE ENCOUNTER (OUTPATIENT)
Dept: URBAN - METROPOLITAN AREA CLINIC 78 | Facility: CLINIC | Age: 27
End: 2022-05-24

## 2022-08-26 ENCOUNTER — TELEPHONE ENCOUNTER (OUTPATIENT)
Dept: URBAN - METROPOLITAN AREA CLINIC 78 | Facility: CLINIC | Age: 27
End: 2022-08-26

## 2022-08-26 RX ORDER — ADALIMUMAB 40MG/0.4ML
INJECT 0.4 MILLILITER (40 MG) BY SUBCUTANEOUS ROUTE EVERY  WEEK IN THE ABDOMEN OR THIGH (ROTATE SITES) KIT SUBCUTANEOUS WEEKLY
Qty: 10 | Refills: 2

## 2022-08-29 ENCOUNTER — TELEPHONE ENCOUNTER (OUTPATIENT)
Dept: URBAN - METROPOLITAN AREA CLINIC 78 | Facility: CLINIC | Age: 27
End: 2022-08-29

## 2022-08-29 RX ORDER — ADALIMUMAB 40MG/0.4ML
INJECT 0.4 MILLILITER (40 MG) BY SUBCUTANEOUS ROUTE EVERY  WEEK IN THE ABDOMEN OR THIGH (ROTATE SITES) KIT SUBCUTANEOUS WEEKLY
Qty: 10 | Refills: 2

## 2022-10-13 ENCOUNTER — OFFICE VISIT (OUTPATIENT)
Dept: URBAN - METROPOLITAN AREA CLINIC 78 | Facility: CLINIC | Age: 27
End: 2022-10-13
Payer: COMMERCIAL

## 2022-10-13 VITALS
BODY MASS INDEX: 36.19 KG/M2 | SYSTOLIC BLOOD PRESSURE: 138 MMHG | TEMPERATURE: 98.1 F | DIASTOLIC BLOOD PRESSURE: 85 MMHG | RESPIRATION RATE: 16 BRPM | HEART RATE: 105 BPM | HEIGHT: 72 IN | WEIGHT: 267.2 LBS

## 2022-10-13 DIAGNOSIS — R14.0 GASSINESS: ICD-10-CM

## 2022-10-13 DIAGNOSIS — R12 HEARTBURN: ICD-10-CM

## 2022-10-13 DIAGNOSIS — D75.89 RED BLOOD CELL DISORDER: ICD-10-CM

## 2022-10-13 DIAGNOSIS — K51.90 ULCERATIVE COLITIS: ICD-10-CM

## 2022-10-13 PROCEDURE — 99214 OFFICE O/P EST MOD 30 MIN: CPT | Performed by: INTERNAL MEDICINE

## 2022-10-13 RX ORDER — METHYLPREDNISOLONE 4 MG
TAKE AS DIRECTED FOR 6 DAYS TABLET, DOSE PACK ORAL
Qty: 1 | Refills: 0 | Status: ON HOLD | COMMUNITY
Start: 2018-02-15

## 2022-10-13 RX ORDER — PREDNISONE 10 MG/1
TAKE 4 TABLET BY ORAL ROUTE QD FOR 5 DAYS, THEN 3 TABLETS DAILY FOR 7 DAYS, THEN 2 TABLETS DAILY FOR 7 DAYS, THEN 1 TABLET DAILY FOR 4 DAYS THEN 0.5 TABLETS DAILY FOR 3 DAYS, THEN STOP TABLET ORAL 1
Qty: 70 | Refills: 0 | Status: ON HOLD | COMMUNITY
Start: 2019-03-26

## 2022-10-13 RX ORDER — ADALIMUMAB 40MG/0.4ML
INJECT 0.4 MILLILITER (40 MG) BY SUBCUTANEOUS ROUTE EVERY  WEEK IN THE ABDOMEN OR THIGH (ROTATE SITES) KIT SUBCUTANEOUS WEEKLY
Qty: 10 | Refills: 2 | Status: ACTIVE | COMMUNITY

## 2022-10-13 RX ORDER — SODIUM, POTASSIUM,MAG SULFATES 17.5-3.13G
354 ML SOLUTION, RECONSTITUTED, ORAL ORAL
Qty: 1 | Refills: 0 | Status: ON HOLD | COMMUNITY
Start: 2020-09-03

## 2022-10-13 RX ORDER — OMEPRAZOLE 40 MG/1
TAKE 1 CAPSULE (40 MG) BY ORAL ROUTE ONCE DAILY 30 MINUTES BEFORE BREAKFAST CAPSULE, DELAYED RELEASE PELLETS ORAL 1
Qty: 30 | Refills: 4 | Status: ACTIVE | COMMUNITY
Start: 2019-10-16

## 2022-10-13 RX ORDER — SODIUM PICOSULFATE, MAGNESIUM OXIDE, AND ANHYDROUS CITRIC ACID 10; 3.5; 12 MG/160ML; G/160ML; G/160ML
160ML AS DIRECTED LIQUID ORAL ONCE
Qty: 1 | Refills: 0
Start: 2021-06-07 | End: 2022-10-14

## 2022-10-13 NOTE — HPI-TODAY'S VISIT:
I have been following the patient for Ulcerative colitis.  I started him on Humira on 4/12/19. He takes his injections once weekly. He has not had any side effects. He is off Mesalamine without any evidence of flares or decline in his overall health.   He is having 1 soft, formed non bloody BM daily. Appetite has been good. No abdominal pain. No fevers or chills. No nausea or vomiting. His GERD has been well-controlled using Omeprazole prn. He has not been using the Citrucel anymore as he felt that he was not struggling with constipation anymore.  He was uanble to inject the Humira for ~ 1 month as they did not want to deliver it to him. He did ok for 3 weeks but on the 4th week he started noticing some sharp abdominal pains. He finally got it on that 4th week and 2 weeks later felt back to "normal."  He has been avoiding soda. He has not had much in the way of burping or heartburn.   His dentist told him he had gingivitis. His Hb has been running high lately.   He has received both his COVID vaccines and booster and plans on receiving the Bivalent vaccine.   Otherwise he continues to feel great. Denies any new complaints or AE to the meds.    Summary of prior workup: - Labs on 4/18/2022: Sodium 139, potassium 3.9, BUN 10, creatinine 0.7, calcium 9.4, total bilirubin 0.6, alkaline phosphatase 84, total protein 8.0, hemoglobin A1c 5.5%.  AST 21, ALT 27, glucose 86, TSH 2.43.  WBC 11.9, hemoglobin 17.7, MCV 88, platelets 332.  UA negative except for trace blood.   - Labs on 6/7/2021 revealed a negative QuantiFERON TB Gold test.  Glucose 98, BUN 11, creatinine 0.7, sodium 141, potassium 4.5, calcium 9.5, total protein 7.9, albumin 4.9, total bilirubin 0.6, alkaline phosphatase 99, AST 17, ALT 26.  Acute hepatitis panel negative.  WBC 8.2, hemoglobin 17.4, MCV 90, platelets 322. - Labs on 12/3/20: Na 139, K 4.0, Cl 101, Gluc 87, BUN 15, C r 0.9, TP 7.9, Alb 4.6, TB 0.7, Ca 9.7, ALT 33, AST 21, AP 90. HbA1c 5.4%, , TSH 1.28, WBC 9.3, Hb 17.2, MCV 90, Plts 327.  - Colonoscopy by me on 11/4/20: Normal mucosa thorughout the colon except for mild patchy erythema in the sigm and rectum, as well as a small inflammatory pseudopolyp removed from the rectum. Bxs consistent with inactive colitis.   - Bloodwork on 4/18/20 revealed a glucose of 97, BUN 11, creatinine 0.8, sodium 142, potassium 4.2, calcium 9.7, TP 7.9, albumin 4.9, TB 0.7, AP slightly elevated at 123, AST 29, ALT 29. WBC 8.9, hemoglobin 17.6, MCV 89, platelets 354, % saturation 31, normal Fe, negative viral hepatitis panel, negative TB test. Vitamin D low at 17.9. B12 323, ferritin 70, CRP 2. - Bloodwork on 1/17/20 revealed an Adlimumab level of 3.2 while Adalimumab antibody was undetectable. - Colonoscopy by me on 12/11/19 revealed a normal terminal ileum and overall normal mucosa throughout the descending, splenic flexure, transverse, hepatic flexure, ascending and cecum. Biopsies were consistent with ongoing mild active colitis. There was evidence of proctosigmoiditis and a few polyps which were noted to be inflammatory and hyperplastic in nature. - Labs on 7/27/19 revealed a glucose of 96, BUN 11, creatinine 0.7, normal electrolytes except for a CO2 of 19. Calcium 9.6, TP 8.0, albumin 4.8, TB 0.9, AP 85, AST 14, ALT 22. WBC 7.2, hemoglobin 17.1, MCV 95, platelets 275. TB negative. ESR 23. CRP 4. Negtive UA on 7/19/19. - Labs on 3/26/19 revealed a normal CMP &CBC, negative hepatitis panel, QuantiFERON-TB gold test could not be run as the sample was insufficient. TSH 2.57. Positive immunity to hepatitis B but not to hepatitis A. CRP normal at 4.3 however ESR was elevated at 20. - GI PCR panel on 3/11/19 was negative.  - Colonoscopy by me on 12/26/18 revealed a normal appearing mucosa throughout without erythema, ulcerations or blood. Random biopsies were consistent with chronic inactive colitis. Around the appendiceal orifice there was some nodularity. Biopsies were unremarkable. There were 2 polyps in the descending colon and 2 polyps in the sigmoid. Path results were consistent with lymphoid aggreagates and HP polyps.  - Labs on 10/1/18 revealed his sodium of 141, potassium 3.9, glucose 79, be you and 16, creatinine 0.8, TP 8.2, albumin 4.9, tea be sure. Seven, ALT 26, AP 83, AST 16, HbA1c 5%. Triglycerides 132, LDL 41, HDL 38. TSH 1.8. White blood cell count 6.9, hemoglobin 16.6, MCV 98, platelet count 322,000. UA On 8/10/18 was negative. - GI PCR panel on 6/27/18 was negative. - C diff negative on 2/27/18. - Colonoscopy by Dr. Montesinos on 12/11/17 showed mild pancolitis extending from the TI to the rectum. TI biopsies were normal. Random colon biopsies revealed mild focal active colitis. - Labs on 11/4/17 disclosed a normal CBC except for an MCV of 99, normal CMP and TPMT of 20.5

## 2022-10-31 ENCOUNTER — TELEPHONE ENCOUNTER (OUTPATIENT)
Dept: URBAN - METROPOLITAN AREA CLINIC 78 | Facility: CLINIC | Age: 27
End: 2022-10-31

## 2023-01-03 ENCOUNTER — TELEPHONE ENCOUNTER (OUTPATIENT)
Dept: URBAN - METROPOLITAN AREA CLINIC 78 | Facility: CLINIC | Age: 28
End: 2023-01-03

## 2023-01-05 PROBLEM — 64766004 ULCERATIVE COLITIS: Status: ACTIVE | Noted: 2021-06-06

## 2023-01-18 ENCOUNTER — CLAIMS CREATED FROM THE CLAIM WINDOW (OUTPATIENT)
Dept: URBAN - METROPOLITAN AREA SURGERY CENTER 15 | Facility: SURGERY CENTER | Age: 28
End: 2023-01-18

## 2023-01-18 ENCOUNTER — CLAIMS CREATED FROM THE CLAIM WINDOW (OUTPATIENT)
Dept: URBAN - METROPOLITAN AREA SURGERY CENTER 15 | Facility: SURGERY CENTER | Age: 28
End: 2023-01-18
Payer: COMMERCIAL

## 2023-01-18 DIAGNOSIS — K51.00 ACUTE ULCERATIVE PANCOLITIS: ICD-10-CM

## 2023-01-18 DIAGNOSIS — K63.89 BACTERIAL OVERGROWTH SYNDROME: ICD-10-CM

## 2023-01-18 PROCEDURE — G8907 PT DOC NO EVENTS ON DISCHARG: HCPCS | Performed by: INTERNAL MEDICINE

## 2023-01-18 PROCEDURE — 45380 COLONOSCOPY AND BIOPSY: CPT | Performed by: INTERNAL MEDICINE

## 2023-01-18 RX ORDER — ADALIMUMAB 40MG/0.4ML
INJECT 0.4 MILLILITER (40 MG) BY SUBCUTANEOUS ROUTE EVERY  WEEK IN THE ABDOMEN OR THIGH (ROTATE SITES) KIT SUBCUTANEOUS WEEKLY
Qty: 10 | Refills: 2 | Status: ACTIVE | COMMUNITY

## 2023-01-18 RX ORDER — METHYLPREDNISOLONE 4 MG
TAKE AS DIRECTED FOR 6 DAYS TABLET, DOSE PACK ORAL
Qty: 1 | Refills: 0 | Status: ON HOLD | COMMUNITY
Start: 2018-02-15

## 2023-01-18 RX ORDER — PREDNISONE 10 MG/1
TAKE 4 TABLET BY ORAL ROUTE QD FOR 5 DAYS, THEN 3 TABLETS DAILY FOR 7 DAYS, THEN 2 TABLETS DAILY FOR 7 DAYS, THEN 1 TABLET DAILY FOR 4 DAYS THEN 0.5 TABLETS DAILY FOR 3 DAYS, THEN STOP TABLET ORAL 1
Qty: 70 | Refills: 0 | Status: ON HOLD | COMMUNITY
Start: 2019-03-26

## 2023-01-18 RX ORDER — OMEPRAZOLE 40 MG/1
TAKE 1 CAPSULE (40 MG) BY ORAL ROUTE ONCE DAILY 30 MINUTES BEFORE BREAKFAST CAPSULE, DELAYED RELEASE PELLETS ORAL 1
Qty: 30 | Refills: 4 | Status: ACTIVE | COMMUNITY
Start: 2019-10-16

## 2023-01-18 RX ORDER — SODIUM, POTASSIUM,MAG SULFATES 17.5-3.13G
354 ML SOLUTION, RECONSTITUTED, ORAL ORAL
Qty: 1 | Refills: 0 | Status: ON HOLD | COMMUNITY
Start: 2020-09-03

## 2023-01-20 ENCOUNTER — TELEPHONE ENCOUNTER (OUTPATIENT)
Dept: URBAN - METROPOLITAN AREA CLINIC 78 | Facility: CLINIC | Age: 28
End: 2023-01-20

## 2023-01-20 ENCOUNTER — P2P PATIENT RECORD (OUTPATIENT)
Age: 28
End: 2023-01-20

## 2023-01-20 RX ORDER — ADALIMUMAB 40MG/0.4ML
INJECT 0.4 MILLILITER (40 MG) BY SUBCUTANEOUS ROUTE EVERY  WEEK IN THE ABDOMEN OR THIGH (ROTATE SITES) KIT SUBCUTANEOUS WEEKLY
Qty: 10 | Refills: 2

## 2023-03-27 ENCOUNTER — TELEPHONE ENCOUNTER (OUTPATIENT)
Dept: URBAN - METROPOLITAN AREA CLINIC 78 | Facility: CLINIC | Age: 28
End: 2023-03-27

## 2023-04-16 ENCOUNTER — ERX REFILL RESPONSE (OUTPATIENT)
Dept: URBAN - METROPOLITAN AREA CLINIC 78 | Facility: CLINIC | Age: 28
End: 2023-04-16

## 2023-04-16 RX ORDER — ADALIMUMAB 40MG/0.4ML
INJECT 40MG SUBCUTANEOUSLY  WEEKLY KIT SUBCUTANEOUS
Qty: 4 EACH | Refills: 0 | OUTPATIENT

## 2023-05-24 NOTE — HPI-TODAY'S VISIT:
I have been following the patient for Ulcerative colitis.  I started him on Humira on 4/12/19. He has not had any side effects. He is also on Mesalamine - now down to 3 tablets daily. He has been compliant with this. He has been off Mercaptopurine completely for a few months now.  He is having 1 soft, formed non bloody BM daily. Appetite has been good. No abdominal pain. No fevers or chills. No nausea or vomiting. His GERD has been well-controlled using Omeprazole prn. He has been using Citrucel once daily. His stools have been formed. No abodminal pain. No blood in the stools.  He does hear some gurgling coming from his stomach at times.   I had instructed him to increased the Humira injections to once weekly. He continues to feel great. Denies any new complaints or AE to the meds.    He had been having some loose stools soon after eating a lot of popcorn.   Summary of prior workup: - Bloodwork on 4/18/20 revealed a glucose of 97, BUN 11, creatinine 0.8, sodium 142, potassium 4.2, calcium 9.7, TP 7.9, albumin 4.9, TB 0.7, AP slightly elevated at 123, AST 29, ALT 29. WBC 8.9, hemoglobin 17.6, MCV 89, platelets 354, % saturation 31, normal Fe, negative viral hepatitis panel, negative TB test. Vitamin D low at 17.9. B12 323, ferritin 70, CRP 2. - Bloodwork on 1/17/20 revealed an Adlimumab level of 3.2 while Adalimumab antibody was undetectable. - Colonoscopy by me on 12/11/19 revealed a normal terminal ileum and overall normal mucosa throughout the descending, splenic flexure, transverse, hepatic flexure, ascending and cecum. Biopsies were consistent with ongoing mild active colitis. There was evidence of proctosigmoiditis and a few polyps which were noted to be inflammatory and hyperplastic in nature. - Labs on 7/27/19 revealed a glucose of 96, BUN 11, creatinine 0.7, normal electrolytes except for a CO2 of 19. Calcium 9.6, TP 8.0, albumin 4.8, TB 0.9, AP 85, AST 14, ALT 22. WBC 7.2, hemoglobin 17.1, MCV 95, platelets 275. TB negative. ESR 23. CRP 4. Negtive UA on 7/19/19. - Labs on 3/26/19 revealed a normal CMP &CBC, negative hepatitis panel, QuantiFERON-TB gold test could not be run as the sample was insufficient. TSH 2.57. Positive immunity to hepatitis B but not to hepatitis A. CRP normal at 4.3 however ESR was elevated at 20. - GI PCR panel on 3/11/19 was negative.  - Colonoscopy by me on 12/26/18 revealed a normal appearing mucosa throughout without erythema, ulcerations or blood. Random biopsies were consistent with chronic inactive colitis. Around the appendiceal orifice there was some nodularity. Biopsies were unremarkable. There were 2 polyps in the descending colon and 2 polyps in the sigmoid. Path results were consistent with lymphoid aggreagates and HP polyps.  - Labs on 10/1/18 revealed his sodium of 141, potassium 3.9, glucose 79, be you and 16, creatinine 0.8, TP 8.2, albumin 4.9, tea be sure. Seven, ALT 26, AP 83, AST 16, HbA1c 5%. Triglycerides 132, LDL 41, HDL 38. TSH 1.8. White blood cell count 6.9, hemoglobin 16.6, MCV 98, platelet count 322,000. UA On 8/10/18 was negative. - GI PCR panel on 6/27/18 was negative. - C diff negative on 2/27/18. - Colonoscopy by Dr. Montesions on 12/11/17 showed mild pancolitis extending from the TI to the rectum. TI biopsies were normal. Random colon biopsies revealed mild focal active colitis. - Labs on 11/4/17 disclosed a normal CBC except for an MCV of 99, normal CMP and TPMT of 20.5
back

## 2023-05-25 ENCOUNTER — TELEPHONE ENCOUNTER (OUTPATIENT)
Dept: URBAN - METROPOLITAN AREA CLINIC 78 | Facility: CLINIC | Age: 28
End: 2023-05-25

## 2023-05-25 RX ORDER — ADALIMUMAB 40MG/0.4ML
INJECT 0.4 MILLILITER (40 MG) BY SUBCUTANEOUS ROUTE EVERY WEEK IN THE ABDOMEN OR THIGH (ROTATE SITES) KIT SUBCUTANEOUS WEEKLY
Qty: 10 | Refills: 2
End: 2023-11-21

## 2023-06-12 ENCOUNTER — OFFICE VISIT (OUTPATIENT)
Dept: URBAN - METROPOLITAN AREA CLINIC 78 | Facility: CLINIC | Age: 28
End: 2023-06-12
Payer: COMMERCIAL

## 2023-06-12 VITALS
TEMPERATURE: 98.2 F | HEIGHT: 72 IN | HEART RATE: 103 BPM | BODY MASS INDEX: 35.49 KG/M2 | SYSTOLIC BLOOD PRESSURE: 138 MMHG | WEIGHT: 262 LBS | RESPIRATION RATE: 16 BRPM | DIASTOLIC BLOOD PRESSURE: 93 MMHG

## 2023-06-12 DIAGNOSIS — K51.90 ULCERATIVE COLITIS: ICD-10-CM

## 2023-06-12 DIAGNOSIS — R12 HEARTBURN: ICD-10-CM

## 2023-06-12 DIAGNOSIS — R14.0 GASSINESS: ICD-10-CM

## 2023-06-12 DIAGNOSIS — D75.89 RED BLOOD CELL DISORDER: ICD-10-CM

## 2023-06-12 PROCEDURE — 99214 OFFICE O/P EST MOD 30 MIN: CPT | Performed by: INTERNAL MEDICINE

## 2023-06-12 RX ORDER — ADALIMUMAB 40MG/0.4ML
INJECT 0.4 MILLILITER (40 MG) BY SUBCUTANEOUS ROUTE EVERY WEEK IN THE ABDOMEN OR THIGH (ROTATE SITES) KIT SUBCUTANEOUS WEEKLY
Qty: 10 | Refills: 2 | Status: ACTIVE | COMMUNITY
End: 2023-11-21

## 2023-06-12 RX ORDER — OMEPRAZOLE 40 MG/1
TAKE 1 CAPSULE (40 MG) BY ORAL ROUTE ONCE DAILY 30 MINUTES BEFORE BREAKFAST CAPSULE, DELAYED RELEASE PELLETS ORAL 1
Qty: 30 | Refills: 4 | Status: ACTIVE | COMMUNITY
Start: 2019-10-16

## 2023-06-12 RX ORDER — SODIUM PICOSULFATE, MAGNESIUM OXIDE, AND ANHYDROUS CITRIC ACID 12; 3.5; 1 G/175ML; G/175ML; MG/175ML
175 ML THE FIRST DOSE THE EVENING BEFORE AND SECOND DOSE THE MORNING OF COLONOSCOPY LIQUID ORAL ONCE A DAY
Qty: 350 | OUTPATIENT
Start: 2023-06-12 | End: 2023-06-14

## 2023-06-12 RX ORDER — METHYLPREDNISOLONE 4 MG
TAKE AS DIRECTED FOR 6 DAYS TABLET, DOSE PACK ORAL
Qty: 1 | Refills: 0 | Status: ON HOLD | COMMUNITY
Start: 2018-02-15

## 2023-06-12 RX ORDER — SODIUM, POTASSIUM,MAG SULFATES 17.5-3.13G
354 ML SOLUTION, RECONSTITUTED, ORAL ORAL
Qty: 1 | Refills: 0 | Status: ON HOLD | COMMUNITY
Start: 2020-09-03

## 2023-06-12 RX ORDER — PREDNISONE 10 MG/1
TAKE 4 TABLET BY ORAL ROUTE QD FOR 5 DAYS, THEN 3 TABLETS DAILY FOR 7 DAYS, THEN 2 TABLETS DAILY FOR 7 DAYS, THEN 1 TABLET DAILY FOR 4 DAYS THEN 0.5 TABLETS DAILY FOR 3 DAYS, THEN STOP TABLET ORAL 1
Qty: 70 | Refills: 0 | Status: ON HOLD | COMMUNITY
Start: 2019-03-26

## 2023-06-12 RX ORDER — ADALIMUMAB 40MG/0.4ML
INJECT 40MG SUBCUTANEOUSLY  WEEKLY KIT SUBCUTANEOUS
Qty: 4 EACH | Refills: 0 | Status: ACTIVE | COMMUNITY

## 2023-06-12 NOTE — HPI-TODAY'S VISIT:
I have been following the patient for Ulcerative colitis.  I started him on Humira on 4/12/19. He takes his injections once weekly. He has not had any side effects. He is off Mesalamine without any evidence of flares or decline in his overall health.   He is having 1 soft, formed non bloody BM daily. Appetite has been good. No abdominal pain. No fevers or chills. No nausea or vomiting. His GERD has been very well-controlled using Omeprazole prn. He has not been having constipation anymore.  He has been using the Humira weekly without any AEs. He denies site reactions.  Both in April and May he had an URI. He had to be treated with Azithromycin and Methylprednisolone.  He also had to see Christelle Wade for an abscess in the upper thigh region. He was treated with Doxycycline.   He has been avoiding soda. He has not had much in the way of burping or heartburn.   His Hb has been running high lately.   Otherwise he continues to feel great.     Summary of prior workup: - Colonoscopy by me on 1/18/2023: Normal terminal ileum, diffuse area of mild erythema extending from the transverse to the ascending colon; biopsies consistent with chronic inactive colitis, no granulomas or dysplasia. Mild erythema also noted in the descending and splenic flexure; biopsies consistent with chronic inactive colitis, no granulomas or dysplasia.  Moderate nodular mucosa found in the sigmoid colon, conssitent with hyperplastic changes.  Normal mucosa in the rectum, biopsies unremarkable. - Labs on 4/18/2022: Sodium 139, potassium 3.9, BUN 10, creatinine 0.7, calcium 9.4, total bilirubin 0.6, alkaline phosphatase 84, total protein 8.0, hemoglobin A1c 5.5%.  AST 21, ALT 27, glucose 86, TSH 2.43.  WBC 11.9, hemoglobin 17.7, MCV 88, platelets 332.  UA negative except for trace blood.   - Labs on 6/7/2021 revealed a negative QuantiFERON TB Gold test.  Glucose 98, BUN 11, creatinine 0.7, sodium 141, potassium 4.5, calcium 9.5, total protein 7.9, albumin 4.9, total bilirubin 0.6, alkaline phosphatase 99, AST 17, ALT 26.  Acute hepatitis panel negative.  WBC 8.2, hemoglobin 17.4, MCV 90, platelets 322. - Labs on 12/3/20: Na 139, K 4.0, Cl 101, Gluc 87, BUN 15, C r 0.9, TP 7.9, Alb 4.6, TB 0.7, Ca 9.7, ALT 33, AST 21, AP 90. HbA1c 5.4%, , TSH 1.28, WBC 9.3, Hb 17.2, MCV 90, Plts 327.  - Colonoscopy by me on 11/4/20: Normal mucosa thorughout the colon except for mild patchy erythema in the sigm and rectum, as well as a small inflammatory pseudopolyp removed from the rectum. Bxs consistent with inactive colitis.   - Bloodwork on 4/18/20 revealed a glucose of 97, BUN 11, creatinine 0.8, sodium 142, potassium 4.2, calcium 9.7, TP 7.9, albumin 4.9, TB 0.7, AP slightly elevated at 123, AST 29, ALT 29. WBC 8.9, hemoglobin 17.6, MCV 89, platelets 354, % saturation 31, normal Fe, negative viral hepatitis panel, negative TB test. Vitamin D low at 17.9. B12 323, ferritin 70, CRP 2. - Bloodwork on 1/17/20 revealed an Adlimumab level of 3.2 while Adalimumab antibody was undetectable. - Colonoscopy by me on 12/11/19 revealed a normal terminal ileum and overall normal mucosa throughout the descending, splenic flexure, transverse, hepatic flexure, ascending and cecum. Biopsies were consistent with ongoing mild active colitis. There was evidence of proctosigmoiditis and a few polyps which were noted to be inflammatory and hyperplastic in nature. - Labs on 7/27/19 revealed a glucose of 96, BUN 11, creatinine 0.7, normal electrolytes except for a CO2 of 19. Calcium 9.6, TP 8.0, albumin 4.8, TB 0.9, AP 85, AST 14, ALT 22. WBC 7.2, hemoglobin 17.1, MCV 95, platelets 275. TB negative. ESR 23. CRP 4. Negtive UA on 7/19/19. - Labs on 3/26/19 revealed a normal CMP &CBC, negative hepatitis panel, QuantiFERON-TB gold test could not be run as the sample was insufficient. TSH 2.57. Positive immunity to hepatitis B but not to hepatitis A. CRP normal at 4.3 however ESR was elevated at 20. - GI PCR panel on 3/11/19 was negative.  - Colonoscopy by me on 12/26/18 revealed a normal appearing mucosa throughout without erythema, ulcerations or blood. Random biopsies were consistent with chronic inactive colitis. Around the appendiceal orifice there was some nodularity. Biopsies were unremarkable. There were 2 polyps in the descending colon and 2 polyps in the sigmoid. Path results were consistent with lymphoid aggreagates and HP polyps.  - Labs on 10/1/18 revealed his sodium of 141, potassium 3.9, glucose 79, be you and 16, creatinine 0.8, TP 8.2, albumin 4.9, tea be sure. Seven, ALT 26, AP 83, AST 16, HbA1c 5%. Triglycerides 132, LDL 41, HDL 38. TSH 1.8. White blood cell count 6.9, hemoglobin 16.6, MCV 98, platelet count 322,000. UA On 8/10/18 was negative. - GI PCR panel on 6/27/18 was negative. - C diff negative on 2/27/18. - Colonoscopy by Dr. Montesinos on 12/11/17 showed mild pancolitis extending from the TI to the rectum. TI biopsies were normal. Random colon biopsies revealed mild focal active colitis. - Labs on 11/4/17 disclosed a normal CBC except for an MCV of 99, normal CMP and TPMT of 20.5

## 2023-11-06 ENCOUNTER — TELEPHONE ENCOUNTER (OUTPATIENT)
Dept: URBAN - METROPOLITAN AREA CLINIC 78 | Facility: CLINIC | Age: 28
End: 2023-11-06

## 2023-11-06 RX ORDER — OMEPRAZOLE 40 MG/1
1 CAPSULE 30 MINUTES BEFORE MORNING MEAL CAPSULE, DELAYED RELEASE ORAL ONCE A DAY
Qty: 30 | OUTPATIENT
Start: 2023-11-06

## 2023-11-20 ENCOUNTER — OFFICE VISIT (OUTPATIENT)
Dept: URBAN - METROPOLITAN AREA CLINIC 78 | Facility: CLINIC | Age: 28
End: 2023-11-20
Payer: COMMERCIAL

## 2023-11-20 VITALS
SYSTOLIC BLOOD PRESSURE: 133 MMHG | BODY MASS INDEX: 33.46 KG/M2 | RESPIRATION RATE: 15 BRPM | DIASTOLIC BLOOD PRESSURE: 93 MMHG | HEIGHT: 72 IN | WEIGHT: 247 LBS | TEMPERATURE: 98.2 F | HEART RATE: 114 BPM

## 2023-11-20 DIAGNOSIS — R10.13 EPIGASTRIC PAIN: ICD-10-CM

## 2023-11-20 DIAGNOSIS — J40 BRONCHITIS: ICD-10-CM

## 2023-11-20 DIAGNOSIS — R14.0 GASSINESS: ICD-10-CM

## 2023-11-20 DIAGNOSIS — D75.89 RED BLOOD CELL DISORDER: ICD-10-CM

## 2023-11-20 DIAGNOSIS — R12 HEARTBURN: ICD-10-CM

## 2023-11-20 DIAGNOSIS — R19.5 LOOSE STOOLS: ICD-10-CM

## 2023-11-20 DIAGNOSIS — K51.90 ULCERATIVE COLITIS: ICD-10-CM

## 2023-11-20 DIAGNOSIS — K62.5 RECTAL BLEEDING: ICD-10-CM

## 2023-11-20 PROCEDURE — 99214 OFFICE O/P EST MOD 30 MIN: CPT | Performed by: INTERNAL MEDICINE

## 2023-11-20 RX ORDER — OMEPRAZOLE 40 MG/1
1 CAPSULE 30 MINUTES BEFORE MORNING MEAL CAPSULE, DELAYED RELEASE ORAL ONCE A DAY
Qty: 30 | COMMUNITY
Start: 2023-11-06

## 2023-11-20 RX ORDER — METHYLPREDNISOLONE 4 MG
TAKE AS DIRECTED FOR 6 DAYS TABLET, DOSE PACK ORAL
Qty: 1 | Refills: 0 | COMMUNITY
Start: 2018-02-15

## 2023-11-20 RX ORDER — PREDNISONE 10 MG/1
TAKE 4 TABLET BY ORAL ROUTE QD FOR 5 DAYS, THEN 3 TABLETS DAILY FOR 7 DAYS, THEN 2 TABLETS DAILY FOR 7 DAYS, THEN 1 TABLET DAILY FOR 4 DAYS THEN 0.5 TABLETS DAILY FOR 3 DAYS, THEN STOP TABLET ORAL 1
Qty: 70 | Refills: 0 | COMMUNITY
Start: 2019-03-26

## 2023-11-20 RX ORDER — OMEPRAZOLE 40 MG/1
TAKE 1 CAPSULE (40 MG) BY ORAL ROUTE ONCE DAILY 30 MINUTES BEFORE BREAKFAST CAPSULE, DELAYED RELEASE PELLETS ORAL 1
Qty: 30 | Refills: 4 | COMMUNITY
Start: 2019-10-16

## 2023-11-20 RX ORDER — ADALIMUMAB 40MG/0.4ML
INJECT 0.4 MILLILITER (40 MG) BY SUBCUTANEOUS ROUTE EVERY WEEK IN THE ABDOMEN OR THIGH (ROTATE SITES) KIT SUBCUTANEOUS WEEKLY
Qty: 10 | Refills: 2 | COMMUNITY
End: 2023-11-21

## 2023-11-20 RX ORDER — ADALIMUMAB 40MG/0.4ML
INJECT 40MG SUBCUTANEOUSLY  WEEKLY KIT SUBCUTANEOUS
Qty: 4 EACH | Refills: 0 | COMMUNITY

## 2023-11-20 RX ORDER — SODIUM, POTASSIUM,MAG SULFATES 17.5-3.13G
354 ML SOLUTION, RECONSTITUTED, ORAL ORAL
Qty: 1 | Refills: 0 | COMMUNITY
Start: 2020-09-03

## 2023-11-20 NOTE — HPI-TODAY'S VISIT:
I have been following the patient for Ulcerative colitis.  I started him on Humira on 4/12/19. He takes his injections once weekly. He has not had any side effects. He is off Mesalamine without any evidence of flares or decline in his overall health.    Appetite has been good. No abdominal pain. No fevers or chills. No nausea or vomiting. His GERD has been very well-controlled using Omeprazole prn. He has not been having constipation anymore.  About 1 month ago he started noticing increased cough. He was treated with antibitoics and a mucus expectorant.  He went on a trip to [place]" (for vacation) where he noticed recurrence of the cough Since then he has started noticing looser stools (~5 BMs daily) and blood streaks in the stools, alongside severe stabbing pain intermittently.  A repeat CXR showed ongoing bronchitis. He was treated with Azithromycin for 7 days.   He is doing a bit better. He has noted now more formed stools and has not seen any further blood. He has also noticed a bit of improvement in the abdominal pain.  He is using Omeprazole daily which he feels has been helping.  He is having cough again and hoarseness for which reason he will be seeing his PCP vanessa.   He has been using the Humira weekly without any AEs. He denies site reactions.  Both in April and May he had an URI. He had to be treated with Azithromycin and Methylprednisolone.  He also had to see Christelle Wade for an abscess in the upper thigh region. He was treated with Doxycycline.  He had another episode earlier this year for which reason he was treated again with antibitoics.   He has been avoiding soda.  His Hb has been running high.   Summary of prior workup: - Colonoscopy by me on 1/18/2023: Normal terminal ileum, diffuse area of mild erythema extending from the transverse to the ascending colon; biopsies consistent with chronic inactive colitis, no granulomas or dysplasia. Mild erythema also noted in the descending and splenic flexure; biopsies consistent with chronic inactive colitis, no granulomas or dysplasia.  Moderate nodular mucosa found in the sigmoid colon, conssitent with hyperplastic changes.  Normal mucosa in the rectum, biopsies unremarkable. - Labs on 4/18/2022: Sodium 139, potassium 3.9, BUN 10, creatinine 0.7, calcium 9.4, total bilirubin 0.6, alkaline phosphatase 84, total protein 8.0, hemoglobin A1c 5.5%.  AST 21, ALT 27, glucose 86, TSH 2.43.  WBC 11.9, hemoglobin 17.7, MCV 88, platelets 332.  UA negative except for trace blood.   - Labs on 6/7/2021 revealed a negative QuantiFERON TB Gold test.  Glucose 98, BUN 11, creatinine 0.7, sodium 141, potassium 4.5, calcium 9.5, total protein 7.9, albumin 4.9, total bilirubin 0.6, alkaline phosphatase 99, AST 17, ALT 26.  Acute hepatitis panel negative.  WBC 8.2, hemoglobin 17.4, MCV 90, platelets 322. - Labs on 12/3/20: Na 139, K 4.0, Cl 101, Gluc 87, BUN 15, C r 0.9, TP 7.9, Alb 4.6, TB 0.7, Ca 9.7, ALT 33, AST 21, AP 90. HbA1c 5.4%, , TSH 1.28, WBC 9.3, Hb 17.2, MCV 90, Plts 327.  - Colonoscopy by me on 11/4/20: Normal mucosa thorughout the colon except for mild patchy erythema in the sigm and rectum, as well as a small inflammatory pseudopolyp removed from the rectum. Bxs consistent with inactive colitis.   - Bloodwork on 4/18/20 revealed a glucose of 97, BUN 11, creatinine 0.8, sodium 142, potassium 4.2, calcium 9.7, TP 7.9, albumin 4.9, TB 0.7, AP slightly elevated at 123, AST 29, ALT 29. WBC 8.9, hemoglobin 17.6, MCV 89, platelets 354, % saturation 31, normal Fe, negative viral hepatitis panel, negative TB test. Vitamin D low at 17.9. B12 323, ferritin 70, CRP 2. - Bloodwork on 1/17/20 revealed an Adlimumab level of 3.2 while Adalimumab antibody was undetectable. - Colonoscopy by me on 12/11/19 revealed a normal terminal ileum and overall normal mucosa throughout the descending, splenic flexure, transverse, hepatic flexure, ascending and cecum. Biopsies were consistent with ongoing mild active colitis. There was evidence of proctosigmoiditis and a few polyps which were noted to be inflammatory and hyperplastic in nature. - Labs on 7/27/19 revealed a glucose of 96, BUN 11, creatinine 0.7, normal electrolytes except for a CO2 of 19. Calcium 9.6, TP 8.0, albumin 4.8, TB 0.9, AP 85, AST 14, ALT 22. WBC 7.2, hemoglobin 17.1, MCV 95, platelets 275. TB negative. ESR 23. CRP 4. Negtive UA on 7/19/19. - Labs on 3/26/19 revealed a normal CMP &CBC, negative hepatitis panel, QuantiFERON-TB gold test could not be run as the sample was insufficient. TSH 2.57. Positive immunity to hepatitis B but not to hepatitis A. CRP normal at 4.3 however ESR was elevated at 20. - GI PCR panel on 3/11/19 was negative.  - Colonoscopy by me on 12/26/18 revealed a normal appearing mucosa throughout without erythema, ulcerations or blood. Random biopsies were consistent with chronic inactive colitis. Around the appendiceal orifice there was some nodularity. Biopsies were unremarkable. There were 2 polyps in the descending colon and 2 polyps in the sigmoid. Path results were consistent with lymphoid aggreagates and HP polyps.  - Labs on 10/1/18 revealed his sodium of 141, potassium 3.9, glucose 79, be you and 16, creatinine 0.8, TP 8.2, albumin 4.9, tea be sure. Seven, ALT 26, AP 83, AST 16, HbA1c 5%. Triglycerides 132, LDL 41, HDL 38. TSH 1.8. White blood cell count 6.9, hemoglobin 16.6, MCV 98, platelet count 322,000. UA On 8/10/18 was negative. - GI PCR panel on 6/27/18 was negative. - C diff negative on 2/27/18. - Colonoscopy by Dr. Montesinos on 12/11/17 showed mild pancolitis extending from the TI to the rectum. TI biopsies were normal. Random colon biopsies revealed mild focal active colitis. - Labs on 11/4/17 disclosed a normal CBC except for an MCV of 99, normal CMP and TPMT of 20.5

## 2023-11-21 ENCOUNTER — LAB OUTSIDE AN ENCOUNTER (OUTPATIENT)
Dept: URBAN - METROPOLITAN AREA CLINIC 78 | Facility: CLINIC | Age: 28
End: 2023-11-21

## 2023-12-01 ENCOUNTER — TELEPHONE ENCOUNTER (OUTPATIENT)
Dept: URBAN - METROPOLITAN AREA CLINIC 78 | Facility: CLINIC | Age: 28
End: 2023-12-01

## 2023-12-01 LAB
ADENOVIRUS F 40/41: NOT DETECTED
CALPROTECTIN, STOOL - QDX: (no result)
CAMPYLOBACTER: NOT DETECTED
CLOSTRIDIUM DIFFICILE: NOT DETECTED
ENTAMOEBA HISTOLYTICA: NOT DETECTED
ENTEROAGGREGATIVE E.COLI: NOT DETECTED
ENTEROTOXIGENIC E.COLI: NOT DETECTED
ESCHERICHIA COLI O157: NOT DETECTED
GIARDIA LAMBLIA: NOT DETECTED
NOROVIRUS GI/GII: NOT DETECTED
OVA AND PARASITE - QDX: (no result)
ROTAVIRUS A: NOT DETECTED
SALMONELLA SPP.: NOT DETECTED
SHIGA-LIKE TOXIN PRODUCING E.COLI: NOT DETECTED
SHIGELLA SPP. / ENTEROINVASIVE E.COLI: NOT DETECTED
VIBRIO PARAHAEMOLYTICUS: NOT DETECTED
VIBRIO SPP.: NOT DETECTED
YERSINIA ENTEROCOLITICA: NOT DETECTED

## 2023-12-15 ENCOUNTER — CLAIMS CREATED FROM THE CLAIM WINDOW (OUTPATIENT)
Dept: URBAN - METROPOLITAN AREA SURGERY CENTER 15 | Facility: SURGERY CENTER | Age: 28
End: 2023-12-15
Payer: COMMERCIAL

## 2023-12-15 ENCOUNTER — CLAIMS CREATED FROM THE CLAIM WINDOW (OUTPATIENT)
Dept: URBAN - METROPOLITAN AREA CLINIC 4 | Facility: CLINIC | Age: 28
End: 2023-12-15
Payer: COMMERCIAL

## 2023-12-15 DIAGNOSIS — K51.00 ACUTE ULCERATIVE PANCOLITIS: ICD-10-CM

## 2023-12-15 DIAGNOSIS — K51.40 INFLAMMATORY FIBROID POLYP OF LARGE INTESTINE: ICD-10-CM

## 2023-12-15 DIAGNOSIS — K51.919 ULCERATIVE COLITIS, UNSPECIFIED WITH UNSPECIFIED COMPLICATIONS: ICD-10-CM

## 2023-12-15 DIAGNOSIS — K51.00 ULCERATIVE (CHRONIC) PANCOLITIS WITHOUT COMPLICATIONS: ICD-10-CM

## 2023-12-15 DIAGNOSIS — K63.89 OTHER SPECIFIED DISEASES OF INTESTINE: ICD-10-CM

## 2023-12-15 PROCEDURE — 88305 TISSUE EXAM BY PATHOLOGIST: CPT | Performed by: PATHOLOGY

## 2023-12-15 PROCEDURE — 45380 COLONOSCOPY AND BIOPSY: CPT | Performed by: INTERNAL MEDICINE

## 2023-12-15 PROCEDURE — G8907 PT DOC NO EVENTS ON DISCHARG: HCPCS | Performed by: INTERNAL MEDICINE

## 2023-12-15 PROCEDURE — 00811 ANES LWR INTST NDSC NOS: CPT | Performed by: NURSE ANESTHETIST, CERTIFIED REGISTERED

## 2023-12-15 RX ORDER — METHYLPREDNISOLONE 4 MG
TAKE AS DIRECTED FOR 6 DAYS TABLET, DOSE PACK ORAL
Qty: 1 | Refills: 0 | COMMUNITY
Start: 2018-02-15

## 2023-12-15 RX ORDER — SODIUM, POTASSIUM,MAG SULFATES 17.5-3.13G
354 ML SOLUTION, RECONSTITUTED, ORAL ORAL
Qty: 1 | Refills: 0 | COMMUNITY
Start: 2020-09-03

## 2023-12-15 RX ORDER — OMEPRAZOLE 40 MG/1
TAKE 1 CAPSULE (40 MG) BY ORAL ROUTE ONCE DAILY 30 MINUTES BEFORE BREAKFAST CAPSULE, DELAYED RELEASE PELLETS ORAL 1
Qty: 30 | Refills: 4 | COMMUNITY
Start: 2019-10-16

## 2023-12-15 RX ORDER — PREDNISONE 10 MG/1
TAKE 4 TABLET BY ORAL ROUTE QD FOR 5 DAYS, THEN 3 TABLETS DAILY FOR 7 DAYS, THEN 2 TABLETS DAILY FOR 7 DAYS, THEN 1 TABLET DAILY FOR 4 DAYS THEN 0.5 TABLETS DAILY FOR 3 DAYS, THEN STOP TABLET ORAL 1
Qty: 70 | Refills: 0 | COMMUNITY
Start: 2019-03-26

## 2023-12-15 RX ORDER — ADALIMUMAB 40MG/0.4ML
INJECT 40MG SUBCUTANEOUSLY  WEEKLY KIT SUBCUTANEOUS
Qty: 4 EACH | Refills: 0 | COMMUNITY

## 2023-12-15 RX ORDER — OMEPRAZOLE 40 MG/1
1 CAPSULE 30 MINUTES BEFORE MORNING MEAL CAPSULE, DELAYED RELEASE ORAL ONCE A DAY
Qty: 30 | COMMUNITY
Start: 2023-11-06

## 2023-12-28 ENCOUNTER — TELEPHONE ENCOUNTER (OUTPATIENT)
Dept: URBAN - METROPOLITAN AREA CLINIC 78 | Facility: CLINIC | Age: 28
End: 2023-12-28

## 2024-03-19 ENCOUNTER — OV EP (OUTPATIENT)
Dept: URBAN - METROPOLITAN AREA CLINIC 78 | Facility: CLINIC | Age: 29
End: 2024-03-19

## 2024-03-19 VITALS
TEMPERATURE: 98.1 F | HEART RATE: 99 BPM | HEIGHT: 72 IN | DIASTOLIC BLOOD PRESSURE: 86 MMHG | BODY MASS INDEX: 33.46 KG/M2 | SYSTOLIC BLOOD PRESSURE: 123 MMHG | WEIGHT: 247 LBS | RESPIRATION RATE: 14 BRPM

## 2024-03-19 PROBLEM — 162864005: Status: ACTIVE | Noted: 2024-03-19

## 2024-03-19 PROBLEM — 10725009: Status: ACTIVE | Noted: 2024-03-19

## 2024-03-19 RX ORDER — PREDNISONE 10 MG/1
TAKE 4 TABLET BY ORAL ROUTE QD FOR 5 DAYS, THEN 3 TABLETS DAILY FOR 7 DAYS, THEN 2 TABLETS DAILY FOR 7 DAYS, THEN 1 TABLET DAILY FOR 4 DAYS THEN 0.5 TABLETS DAILY FOR 3 DAYS, THEN STOP TABLET ORAL 1
Qty: 70 | Refills: 0 | Status: ON HOLD | COMMUNITY
Start: 2019-03-26

## 2024-03-19 RX ORDER — OMEPRAZOLE 40 MG/1
TAKE 1 CAPSULE (40 MG) BY ORAL ROUTE ONCE DAILY 30 MINUTES BEFORE BREAKFAST CAPSULE, DELAYED RELEASE PELLETS ORAL 1
Qty: 30 | Refills: 4 | Status: ACTIVE | COMMUNITY
Start: 2019-10-16

## 2024-03-19 RX ORDER — MESALAMINE 1.2 G/1
3 TABLETS IN THE MORNING WITH A MEAL TABLET, DELAYED RELEASE ORAL ONCE A DAY
Qty: 90 TABLET | Refills: 1
Start: 2022-04-01 | End: 2024-05-18

## 2024-03-19 RX ORDER — SODIUM, POTASSIUM,MAG SULFATES 17.5-3.13G
354 ML SOLUTION, RECONSTITUTED, ORAL ORAL
Qty: 1 | Refills: 0 | Status: ON HOLD | COMMUNITY
Start: 2020-09-03

## 2024-03-19 RX ORDER — METHYLPREDNISOLONE 4 MG
TAKE AS DIRECTED FOR 6 DAYS TABLET, DOSE PACK ORAL
Qty: 1 | Refills: 0 | Status: ON HOLD | COMMUNITY
Start: 2018-02-15

## 2024-03-19 RX ORDER — OMEPRAZOLE 40 MG/1
1 CAPSULE 30 MINUTES BEFORE MORNING MEAL CAPSULE, DELAYED RELEASE ORAL ONCE A DAY
Qty: 30 | Status: ON HOLD | COMMUNITY
Start: 2023-11-06

## 2024-03-19 RX ORDER — ADALIMUMAB 40MG/0.4ML
INJECT 40MG SUBCUTANEOUSLY  WEEKLY KIT SUBCUTANEOUS
Qty: 4 EACH | Refills: 0 | Status: ACTIVE | COMMUNITY

## 2024-03-19 NOTE — HPI-TODAY'S VISIT:
I have been following the patient for Ulcerative colitis.  I started him on Humira on 4/12/19. He takes his injections once weekly. He has not had any side effects. He is off Mesalamine without any evidence of flares or decline in his overall health.    Appetite has been good. No abdominal pain. No fevers or chills. No nausea or vomiting. His GERD has been very well-controlled using Omeprazole prn. He has not been having constipation anymore.  About 1 month ago he started noticing increased cough. He was treated with antibitoics and a mucus expectorant.  He went on a trip to [email or web address] (for vacation) where he noticed recurrence of the cough Since then he has started noticing looser stools (~5 BMs daily) and blood streaks in the stools, alongside severe stabbing pain intermittently.  A repeat CXR showed ongoing bronchitis. He was treated with Azithromycin for 7 days.   He is doing a bit better. He has noted now more formed stools and has not seen any further blood. He has also noticed a bit of improvement in the abdominal pain.  He is using Omeprazole daily which he feels has been helping.  He is having cough again and hoarseness for which reason he will be seeing his PCP vanessa.   He has been using the Humira weekly without any AEs. He denies site reactions.  Both in April and May 2023 he had an URI. He had to be treated with Azithromycin and Methylprednisolone.  He also had to see Christelle Wade for an abscess in the upper thigh region. He was treated with Doxycycline X 2.   Today we reviewed the results of  He has been avoiding soda.  He was recently diagnosed with HTN. He was started on Amlodipine.   Summary of prior workup: - Colonoscopy by me on .................... - GI PCR panel on 11/21/23: negative for any pathogens. Ova and parasites were also negative as was Cyclospora, Cryptosporidium, Isospora and Microsporidia. Fecal inflammatory marker was quite high however at 640. - Colonoscopy by me on 1/18/2023: Normal terminal ileum, diffuse area of mild erythema extending from the transverse to the ascending colon; biopsies consistent with chronic inactive colitis, no granulomas or dysplasia. Mild erythema also noted in the descending and splenic flexure; biopsies consistent with chronic inactive colitis, no granulomas or dysplasia.  Moderate nodular mucosa found in the sigmoid colon, conssitent with hyperplastic changes.  Normal mucosa in the rectum, biopsies unremarkable. - Labs on 4/18/2022: Sodium 139, potassium 3.9, BUN 10, creatinine 0.7, calcium 9.4, total bilirubin 0.6, alkaline phosphatase 84, total protein 8.0, hemoglobin A1c 5.5%.  AST 21, ALT 27, glucose 86, TSH 2.43.  WBC 11.9, hemoglobin 17.7, MCV 88, platelets 332.  UA negative except for trace blood.   - Labs on 6/7/2021 revealed a negative QuantiFERON TB Gold test.  Glucose 98, BUN 11, creatinine 0.7, sodium 141, potassium 4.5, calcium 9.5, total protein 7.9, albumin 4.9, total bilirubin 0.6, alkaline phosphatase 99, AST 17, ALT 26.  Acute hepatitis panel negative.  WBC 8.2, hemoglobin 17.4, MCV 90, platelets 322. - Labs on 12/3/20: Na 139, K 4.0, Cl 101, Gluc 87, BUN 15, C r 0.9, TP 7.9, Alb 4.6, TB 0.7, Ca 9.7, ALT 33, AST 21, AP 90. HbA1c 5.4%, , TSH 1.28, WBC 9.3, Hb 17.2, MCV 90, Plts 327.  - Colonoscopy by me on 11/4/20: Normal mucosa thorughout the colon except for mild patchy erythema in the sigm and rectum, as well as a small inflammatory pseudopolyp removed from the rectum. Bxs consistent with inactive colitis.   - Bloodwork on 4/18/20 revealed a glucose of 97, BUN 11, creatinine 0.8, sodium 142, potassium 4.2, calcium 9.7, TP 7.9, albumin 4.9, TB 0.7, AP slightly elevated at 123, AST 29, ALT 29. WBC 8.9, hemoglobin 17.6, MCV 89, platelets 354, % saturation 31, normal Fe, negative viral hepatitis panel, negative TB test. Vitamin D low at 17.9. B12 323, ferritin 70, CRP 2. - Bloodwork on 1/17/20 revealed an Adlimumab level of 3.2 while Adalimumab antibody was undetectable. - Colonoscopy by me on 12/11/19 revealed a normal terminal ileum and overall normal mucosa throughout the descending, splenic flexure, transverse, hepatic flexure, ascending and cecum. Biopsies were consistent with ongoing mild active colitis. There was evidence of proctosigmoiditis and a few polyps which were noted to be inflammatory and hyperplastic in nature. - Labs on 7/27/19 revealed a glucose of 96, BUN 11, creatinine 0.7, normal electrolytes except for a CO2 of 19. Calcium 9.6, TP 8.0, albumin 4.8, TB 0.9, AP 85, AST 14, ALT 22. WBC 7.2, hemoglobin 17.1, MCV 95, platelets 275. TB negative. ESR 23. CRP 4. Negtive UA on 7/19/19. - Labs on 3/26/19 revealed a normal CMP &CBC, negative hepatitis panel, QuantiFERON-TB gold test could not be run as the sample was insufficient. TSH 2.57. Positive immunity to hepatitis B but not to hepatitis A. CRP normal at 4.3 however ESR was elevated at 20. - GI PCR panel on 3/11/19 was negative.  - Colonoscopy by me on 12/26/18 revealed a normal appearing mucosa throughout without erythema, ulcerations or blood. Random biopsies were consistent with chronic inactive colitis. Around the appendiceal orifice there was some nodularity. Biopsies were unremarkable. There were 2 polyps in the descending colon and 2 polyps in the sigmoid. Path results were consistent with lymphoid aggreagates and HP polyps.  - Labs on 10/1/18 revealed his sodium of 141, potassium 3.9, glucose 79, be you and 16, creatinine 0.8, TP 8.2, albumin 4.9, tea be sure. Seven, ALT 26, AP 83, AST 16, HbA1c 5%. Triglycerides 132, LDL 41, HDL 38. TSH 1.8. White blood cell count 6.9, hemoglobin 16.6, MCV 98, platelet count 322,000. UA On 8/10/18 was negative. - GI PCR panel on 6/27/18 was negative. - C diff negative on 2/27/18. - Colonoscopy by Dr. Montesinos on 12/11/17 showed mild pancolitis extending from the TI to the rectum. TI biopsies were normal. Random colon biopsies revealed mild focal active colitis. - Labs on 11/4/17 disclosed a normal CBC except for an MCV of 99, normal CMP and TPMT of 20.5

## 2024-06-17 ENCOUNTER — DASHBOARD ENCOUNTERS (OUTPATIENT)
Age: 29
End: 2024-06-17

## 2024-06-17 ENCOUNTER — OFFICE VISIT (OUTPATIENT)
Dept: URBAN - METROPOLITAN AREA CLINIC 78 | Facility: CLINIC | Age: 29
End: 2024-06-17
Payer: COMMERCIAL

## 2024-06-17 VITALS
BODY MASS INDEX: 33.72 KG/M2 | SYSTOLIC BLOOD PRESSURE: 127 MMHG | HEIGHT: 72 IN | WEIGHT: 249 LBS | DIASTOLIC BLOOD PRESSURE: 84 MMHG | TEMPERATURE: 97.9 F | HEART RATE: 98 BPM

## 2024-06-17 DIAGNOSIS — R12 HEARTBURN: ICD-10-CM

## 2024-06-17 DIAGNOSIS — R10.13 EPIGASTRIC PAIN: ICD-10-CM

## 2024-06-17 DIAGNOSIS — K51.80 CHRONIC PANCOLONIC ULCERATIVE COLITIS: ICD-10-CM

## 2024-06-17 DIAGNOSIS — E66.01 OBESITY: ICD-10-CM

## 2024-06-17 PROCEDURE — 99214 OFFICE O/P EST MOD 30 MIN: CPT | Performed by: INTERNAL MEDICINE

## 2024-06-17 RX ORDER — OMEPRAZOLE 40 MG/1
TAKE 1 CAPSULE (40 MG) BY ORAL ROUTE ONCE DAILY 30 MINUTES BEFORE BREAKFAST CAPSULE, DELAYED RELEASE PELLETS ORAL 1
Qty: 30 | Refills: 4 | Status: ACTIVE | COMMUNITY
Start: 2019-10-16

## 2024-06-17 RX ORDER — SODIUM PICOSULFATE, MAGNESIUM OXIDE, AND ANHYDROUS CITRIC ACID 12; 3.5; 1 G/175ML; G/175ML; MG/175ML
175 ML THE FIRST DOSE THE EVENING BEFORE AND SECOND DOSE THE MORNING OF COLONOSCOPY LIQUID ORAL ONCE A DAY
Qty: 350 | OUTPATIENT
Start: 2024-06-17 | End: 2024-06-19

## 2024-06-17 RX ORDER — PREDNISONE 10 MG/1
TAKE 4 TABLET BY ORAL ROUTE QD FOR 5 DAYS, THEN 3 TABLETS DAILY FOR 7 DAYS, THEN 2 TABLETS DAILY FOR 7 DAYS, THEN 1 TABLET DAILY FOR 4 DAYS THEN 0.5 TABLETS DAILY FOR 3 DAYS, THEN STOP TABLET ORAL 1
Qty: 70 | Refills: 0 | Status: ON HOLD | COMMUNITY
Start: 2019-03-26

## 2024-06-17 RX ORDER — SODIUM, POTASSIUM,MAG SULFATES 17.5-3.13G
354 ML SOLUTION, RECONSTITUTED, ORAL ORAL
Qty: 1 | Refills: 0 | Status: ON HOLD | COMMUNITY
Start: 2020-09-03

## 2024-06-17 RX ORDER — OMEPRAZOLE 40 MG/1
1 CAPSULE 30 MINUTES BEFORE MORNING MEAL CAPSULE, DELAYED RELEASE ORAL ONCE A DAY
Qty: 30 | Status: ON HOLD | COMMUNITY
Start: 2023-11-06

## 2024-06-17 RX ORDER — METHYLPREDNISOLONE 4 MG
TAKE AS DIRECTED FOR 6 DAYS TABLET, DOSE PACK ORAL
Qty: 1 | Refills: 0 | Status: ON HOLD | COMMUNITY
Start: 2018-02-15

## 2024-06-17 RX ORDER — ADALIMUMAB 40MG/0.4ML
INJECT 40MG SUBCUTANEOUSLY  WEEKLY KIT SUBCUTANEOUS
Qty: 4 EACH | Refills: 0 | Status: ACTIVE | COMMUNITY

## 2024-06-17 NOTE — HPI-TODAY'S VISIT:
I have been following the patient for Ulcerative colitis.  I started him on Humira on 4/12/19. He takes his injections once weekly. He has not had any side effects. He has been off Mesalamine for several years now,  He is doing overall well. He has noted now more formed stools and has not seen any blood. He has also noticed a bit of improvement in the abdominal pain.  He is using Omeprazole daily which he feels has been helping with his GERD.  No recent URIs.  He has been using the Humira weekly without any AEs. He denies site reactions.  Both in April and May 2023 he had an URI. He had to be treated with Azithromycin and Methylprednisolone.  He also had to see Christelle Wade for an abscess in the upper thigh region. He was treated with Doxycycline X 2. These have  not recurred.  He has been avoiding soda.  He was recently diagnosed with HTN. He was started on Amlodipine.   Summary of prior workup: - Colonoscopy by me on 12/15/23: Moderate pancolonic colitis (biopsies confirmed diffuse active colitis) as well as a 4mm desc and 4mm sigm inflammatory pseudopolyp. Repeat colonoscopy advised in 1 year. - GI PCR panel on 11/21/23: negative for any pathogens. Ova and parasites were also negative as was Cyclospora, Cryptosporidium, Isospora and Microsporidia. Fecal inflammatory marker was quite high however at 640. - Colonoscopy by me on 1/18/2023: Normal terminal ileum, diffuse area of mild erythema extending from the transverse to the ascending colon; biopsies consistent with chronic inactive colitis, no granulomas or dysplasia. Mild erythema also noted in the descending and splenic flexure; biopsies consistent with chronic inactive colitis, no granulomas or dysplasia.  Moderate nodular mucosa found in the sigmoid colon, conssitent with hyperplastic changes.  Normal mucosa in the rectum, biopsies unremarkable. - Labs on 4/18/2022: Sodium 139, potassium 3.9, BUN 10, creatinine 0.7, calcium 9.4, total bilirubin 0.6, alkaline phosphatase 84, total protein 8.0, hemoglobin A1c 5.5%.  AST 21, ALT 27, glucose 86, TSH 2.43.  WBC 11.9, hemoglobin 17.7, MCV 88, platelets 332.  UA negative except for trace blood.   - Labs on 6/7/2021 revealed a negative QuantiFERON TB Gold test.  Glucose 98, BUN 11, creatinine 0.7, sodium 141, potassium 4.5, calcium 9.5, total protein 7.9, albumin 4.9, total bilirubin 0.6, alkaline phosphatase 99, AST 17, ALT 26.  Acute hepatitis panel negative.  WBC 8.2, hemoglobin 17.4, MCV 90, platelets 322. - Labs on 12/3/20: Na 139, K 4.0, Cl 101, Gluc 87, BUN 15, C r 0.9, TP 7.9, Alb 4.6, TB 0.7, Ca 9.7, ALT 33, AST 21, AP 90. HbA1c 5.4%, , TSH 1.28, WBC 9.3, Hb 17.2, MCV 90, Plts 327.  - Colonoscopy by me on 11/4/20: Normal mucosa thorughout the colon except for mild patchy erythema in the sigm and rectum, as well as a small inflammatory pseudopolyp removed from the rectum. Bxs consistent with inactive colitis.   - Bloodwork on 4/18/20 revealed a glucose of 97, BUN 11, creatinine 0.8, sodium 142, potassium 4.2, calcium 9.7, TP 7.9, albumin 4.9, TB 0.7, AP slightly elevated at 123, AST 29, ALT 29. WBC 8.9, hemoglobin 17.6, MCV 89, platelets 354, % saturation 31, normal Fe, negative viral hepatitis panel, negative TB test. Vitamin D low at 17.9. B12 323, ferritin 70, CRP 2. - Bloodwork on 1/17/20 revealed an Adlimumab level of 3.2 while Adalimumab antibody was undetectable. - Colonoscopy by me on 12/11/19 revealed a normal terminal ileum and overall normal mucosa throughout the descending, splenic flexure, transverse, hepatic flexure, ascending and cecum. Biopsies were consistent with ongoing mild active colitis. There was evidence of proctosigmoiditis and a few polyps which were noted to be inflammatory and hyperplastic in nature. - Labs on 7/27/19 revealed a glucose of 96, BUN 11, creatinine 0.7, normal electrolytes except for a CO2 of 19. Calcium 9.6, TP 8.0, albumin 4.8, TB 0.9, AP 85, AST 14, ALT 22. WBC 7.2, hemoglobin 17.1, MCV 95, platelets 275. TB negative. ESR 23. CRP 4. Negtive UA on 7/19/19. - Labs on 3/26/19 revealed a normal CMP &CBC, negative hepatitis panel, QuantiFERON-TB gold test could not be run as the sample was insufficient. TSH 2.57. Positive immunity to hepatitis B but not to hepatitis A. CRP normal at 4.3 however ESR was elevated at 20. - GI PCR panel on 3/11/19 was negative.  - Colonoscopy by me on 12/26/18 revealed a normal appearing mucosa throughout without erythema, ulcerations or blood. Random biopsies were consistent with chronic inactive colitis. Around the appendiceal orifice there was some nodularity. Biopsies were unremarkable. There were 2 polyps in the descending colon and 2 polyps in the sigmoid. Path results were consistent with lymphoid aggreagates and HP polyps.  - Labs on 10/1/18 revealed his sodium of 141, potassium 3.9, glucose 79, be you and 16, creatinine 0.8, TP 8.2, albumin 4.9, tea be sure. Seven, ALT 26, AP 83, AST 16, HbA1c 5%. Triglycerides 132, LDL 41, HDL 38. TSH 1.8. White blood cell count 6.9, hemoglobin 16.6, MCV 98, platelet count 322,000. UA On 8/10/18 was negative. - GI PCR panel on 6/27/18 was negative. - C diff negative on 2/27/18. - Colonoscopy by Dr. Montesinos on 12/11/17 showed mild pancolitis extending from the TI to the rectum. TI biopsies were normal. Random colon biopsies revealed mild focal active colitis. - Labs on 11/4/17 disclosed a normal CBC except for an MCV of 99, normal CMP and TPMT of 20.5 I have been following the patient for Ulcerative colitis.  I started him on Humira on 4/12/19. He takes his injections once weekly. He has not had any side effects. He has been off Mesalamine for several years now,  He is doing a bit better. He has noted now more formed stools and has not seen any further blood. He has also noticed a bit of improvement in the abdominal pain.  He is using Omeprazole daily which he feels has been helping with his GERD.  He is having cough again and hoarseness for which reason he will be seeing his PCP vanessa.   He has been using the Humira weekly without any AEs. He denies site reactions.  Both in April and May 2023 he had an URI. He had to be treated with Azithromycin and Methylprednisolone.  He also had to see Christelle Wade for an abscess in the upper thigh region. He was treated with Doxycycline X 2.   Today we reviewed the results of his recent colonoscopy/path.  He has been avoiding soda.  He was recently diagnosed with HTN. He was started on Amlodipine.   Summary of prior workup: - Colonoscopy by me on 12/15/23: Moderate pancolonic colitis (biopsies confirmed diffuse active colitis) as well as a 4mm desc and 4mm sigm inflammatory pseudopolyp. Repeat colonoscopy advised in 1 year. - GI PCR panel on 11/21/23: negative for any pathogens. Ova and parasites were also negative as was Cyclospora, Cryptosporidium, Isospora and Microsporidia. Fecal inflammatory marker was quite high however at 640. - Colonoscopy by me on 1/18/2023: Normal terminal ileum, diffuse area of mild erythema extending from the transverse to the ascending colon; biopsies consistent with chronic inactive colitis, no granulomas or dysplasia. Mild erythema also noted in the descending and splenic flexure; biopsies consistent with chronic inactive colitis, no granulomas or dysplasia.  Moderate nodular mucosa found in the sigmoid colon, conssitent with hyperplastic changes.  Normal mucosa in the rectum, biopsies unremarkable. - Labs on 4/18/2022: Sodium 139, potassium 3.9, BUN 10, creatinine 0.7, calcium 9.4, total bilirubin 0.6, alkaline phosphatase 84, total protein 8.0, hemoglobin A1c 5.5%.  AST 21, ALT 27, glucose 86, TSH 2.43.  WBC 11.9, hemoglobin 17.7, MCV 88, platelets 332.  UA negative except for trace blood.   - Labs on 6/7/2021 revealed a negative QuantiFERON TB Gold test.  Glucose 98, BUN 11, creatinine 0.7, sodium 141, potassium 4.5, calcium 9.5, total protein 7.9, albumin 4.9, total bilirubin 0.6, alkaline phosphatase 99, AST 17, ALT 26.  Acute hepatitis panel negative.  WBC 8.2, hemoglobin 17.4, MCV 90, platelets 322. - Labs on 12/3/20: Na 139, K 4.0, Cl 101, Gluc 87, BUN 15, C r 0.9, TP 7.9, Alb 4.6, TB 0.7, Ca 9.7, ALT 33, AST 21, AP 90. HbA1c 5.4%, , TSH 1.28, WBC 9.3, Hb 17.2, MCV 90, Plts 327.  - Colonoscopy by me on 11/4/20: Normal mucosa thorughout the colon except for mild patchy erythema in the sigm and rectum, as well as a small inflammatory pseudopolyp removed from the rectum. Bxs consistent with inactive colitis.   - Bloodwork on 4/18/20 revealed a glucose of 97, BUN 11, creatinine 0.8, sodium 142, potassium 4.2, calcium 9.7, TP 7.9, albumin 4.9, TB 0.7, AP slightly elevated at 123, AST 29, ALT 29. WBC 8.9, hemoglobin 17.6, MCV 89, platelets 354, % saturation 31, normal Fe, negative viral hepatitis panel, negative TB test. Vitamin D low at 17.9. B12 323, ferritin 70, CRP 2. - Bloodwork on 1/17/20 revealed an Adlimumab level of 3.2 while Adalimumab antibody was undetectable. - Colonoscopy by me on 12/11/19 revealed a normal terminal ileum and overall normal mucosa throughout the descending, splenic flexure, transverse, hepatic flexure, ascending and cecum. Biopsies were consistent with ongoing mild active colitis. There was evidence of proctosigmoiditis and a few polyps which were noted to be inflammatory and hyperplastic in nature. - Labs on 7/27/19 revealed a glucose of 96, BUN 11, creatinine 0.7, normal electrolytes except for a CO2 of 19. Calcium 9.6, TP 8.0, albumin 4.8, TB 0.9, AP 85, AST 14, ALT 22. WBC 7.2, hemoglobin 17.1, MCV 95, platelets 275. TB negative. ESR 23. CRP 4. Negtive UA on 7/19/19. - Labs on 3/26/19 revealed a normal CMP &CBC, negative hepatitis panel, QuantiFERON-TB gold test could not be run as the sample was insufficient. TSH 2.57. Positive immunity to hepatitis B but not to hepatitis A. CRP normal at 4.3 however ESR was elevated at 20. - GI PCR panel on 3/11/19 was negative.  - Colonoscopy by me on 12/26/18 revealed a normal appearing mucosa throughout without erythema, ulcerations or blood. Random biopsies were consistent with chronic inactive colitis. Around the appendiceal orifice there was some nodularity. Biopsies were unremarkable. There were 2 polyps in the descending colon and 2 polyps in the sigmoid. Path results were consistent with lymphoid aggreagates and HP polyps.  - Labs on 10/1/18 revealed his sodium of 141, potassium 3.9, glucose 79, be you and 16, creatinine 0.8, TP 8.2, albumin 4.9, tea be sure. Seven, ALT 26, AP 83, AST 16, HbA1c 5%. Triglycerides 132, LDL 41, HDL 38. TSH 1.8. White blood cell count 6.9, hemoglobin 16.6, MCV 98, platelet count 322,000. UA On 8/10/18 was negative. - GI PCR panel on 6/27/18 was negative. - C diff negative on 2/27/18. - Colonoscopy by Dr. Montesinos on 12/11/17 showed mild pancolitis extending from the TI to the rectum. TI biopsies were normal. Random colon biopsies revealed mild focal active colitis. - Labs on 11/4/17 disclosed a normal CBC except for an MCV of 99, normal CMP and TPMT of 20.5

## 2024-06-28 LAB
ALBUMIN: 4.6
ALKALINE PHOSPHATASE: 133
ALT (SGPT): 36
AST (SGOT): 21
BILIRUBIN, TOTAL: 0.6
BUN/CREATININE RATIO: 9
BUN: 7
C-REACTIVE PROTEIN, QUANT: 4
CALCIUM: 9.5
CALPROTECTIN, FECAL: 2310
CARBON DIOXIDE, TOTAL: 22
CHLORIDE: 103
CREATININE: 0.8
EGFR: 123
FERRITIN, SERUM: 61
FOLATE (FOLIC ACID), SERUM: 9.9
GLOBULIN, TOTAL: 3.4
GLUCOSE: 95
HBSAG SCREEN: NEGATIVE
HCV AB: NON REACTIVE
HEMATOCRIT: 51.1
HEMOGLOBIN: 17.2
HEP A AB, IGM: NEGATIVE
HEP B CORE AB, IGM: NEGATIVE
INTERPRETATION:: (no result)
IRON BIND.CAP.(TIBC): 354
IRON SATURATION: 26
IRON: 92
MCH: 29.2
MCHC: 33.7
MCV: 87
NRBC: (no result)
PLATELETS: 330
POTASSIUM: 4.3
PROTEIN, TOTAL: 8
QUANTIFERON CRITERIA: (no result)
QUANTIFERON INCUBATION: (no result)
QUANTIFERON MITOGEN VALUE: >10
QUANTIFERON NIL VALUE: 0.03
QUANTIFERON TB1 AG VALUE: 0.03
QUANTIFERON TB2 AG VALUE: 0.04
QUANTIFERON-TB GOLD PLUS: NEGATIVE
RBC: 5.9
RDW: 12.4
SODIUM: 137
TSH: 2
UIBC: 262
VITAMIN B12: 642
VITAMIN D, 25-HYDROXY: 15.6
WBC: 10.5

## 2024-06-30 ENCOUNTER — TELEPHONE ENCOUNTER (OUTPATIENT)
Dept: URBAN - METROPOLITAN AREA CLINIC 78 | Facility: CLINIC | Age: 29
End: 2024-06-30

## 2024-06-30 RX ORDER — CHOLECALCIFEROL (VITAMIN D3) 50 MCG
1 TABLET TABLET ORAL ONCE A DAY
Qty: 90 | Refills: 1 | OUTPATIENT
Start: 2024-06-30 | End: 2024-08-29

## 2024-07-01 ENCOUNTER — TELEPHONE ENCOUNTER (OUTPATIENT)
Dept: URBAN - METROPOLITAN AREA CLINIC 78 | Facility: CLINIC | Age: 29
End: 2024-07-01

## 2024-07-12 ENCOUNTER — TELEPHONE ENCOUNTER (OUTPATIENT)
Dept: URBAN - METROPOLITAN AREA CLINIC 78 | Facility: CLINIC | Age: 29
End: 2024-07-12

## 2024-07-12 ENCOUNTER — WEB ENCOUNTER (OUTPATIENT)
Dept: URBAN - METROPOLITAN AREA CLINIC 78 | Facility: CLINIC | Age: 29
End: 2024-07-12

## 2024-07-12 ENCOUNTER — LAB OUTSIDE AN ENCOUNTER (OUTPATIENT)
Dept: URBAN - METROPOLITAN AREA CLINIC 78 | Facility: CLINIC | Age: 29
End: 2024-07-12

## 2024-07-16 LAB
ADENOVIRUS F 40/41: NOT DETECTED
ASTROVIRUS: NOT DETECTED
C DIFFICILE TOXIN A/B: NOT DETECTED
CAMPYLOBACTER: NOT DETECTED
CRYPTOSPORIDIUM: NOT DETECTED
CYCLOSPORA CAYETANENSIS: NOT DETECTED
E COLI O157: (no result)
ENTAMOEBA HISTOLYTICA: NOT DETECTED
ENTEROAGGREGATIVE E COLI: NOT DETECTED
ENTEROPATHOGENIC E COLI: NOT DETECTED
ENTEROTOXIGENIC E COLI: NOT DETECTED
GIARDIA LAMBLIA: NOT DETECTED
NOROVIRUS GI/GII: NOT DETECTED
PLESIOMONAS SHIGELLOIDES: NOT DETECTED
ROTAVIRUS A: NOT DETECTED
SALMONELLA: NOT DETECTED
SAPOVIRUS: NOT DETECTED
SHIGA-TOXIN-PRODUCING E COLI: NOT DETECTED
SHIGELLA/ENTEROINVASIVE E COLI: NOT DETECTED
VIBRIO CHOLERAE: NOT DETECTED
VIBRIO: NOT DETECTED
YERSINIA ENTEROCOLITICA: NOT DETECTED

## 2024-07-19 ENCOUNTER — TELEPHONE ENCOUNTER (OUTPATIENT)
Dept: URBAN - METROPOLITAN AREA CLINIC 78 | Facility: CLINIC | Age: 29
End: 2024-07-19

## 2024-08-05 ENCOUNTER — OFFICE VISIT (OUTPATIENT)
Dept: URBAN - METROPOLITAN AREA CLINIC 78 | Facility: CLINIC | Age: 29
End: 2024-08-05
Payer: COMMERCIAL

## 2024-08-05 VITALS
RESPIRATION RATE: 16 BRPM | WEIGHT: 234.6 LBS | SYSTOLIC BLOOD PRESSURE: 130 MMHG | TEMPERATURE: 98.1 F | HEIGHT: 72 IN | HEART RATE: 102 BPM | DIASTOLIC BLOOD PRESSURE: 90 MMHG | BODY MASS INDEX: 31.77 KG/M2

## 2024-08-05 DIAGNOSIS — R11.0 NAUSEA: ICD-10-CM

## 2024-08-05 DIAGNOSIS — K51.80 CHRONIC PANCOLONIC ULCERATIVE COLITIS: ICD-10-CM

## 2024-08-05 DIAGNOSIS — R68.81 EARLY SATIETY: ICD-10-CM

## 2024-08-05 PROCEDURE — 99214 OFFICE O/P EST MOD 30 MIN: CPT

## 2024-08-05 RX ORDER — PREDNISONE 10 MG/1
TAKE 4 TABLET BY ORAL ROUTE QD FOR 5 DAYS, THEN 3 TABLETS DAILY FOR 7 DAYS, THEN 2 TABLETS DAILY FOR 7 DAYS, THEN 1 TABLET DAILY FOR 4 DAYS THEN 0.5 TABLETS DAILY FOR 3 DAYS, THEN STOP TABLET ORAL 1
Qty: 70 | Refills: 0 | Status: ON HOLD | COMMUNITY
Start: 2019-03-26

## 2024-08-05 RX ORDER — ONDANSETRON HYDROCHLORIDE 4 MG/1
1 TABLET TABLET, FILM COATED ORAL ONCE A DAY
Qty: 30 | OUTPATIENT
Start: 2024-08-05

## 2024-08-05 RX ORDER — SODIUM, POTASSIUM,MAG SULFATES 17.5-3.13G
354 ML SOLUTION, RECONSTITUTED, ORAL ORAL
Qty: 1 | Refills: 0 | Status: ON HOLD | COMMUNITY
Start: 2020-09-03

## 2024-08-05 RX ORDER — CHOLECALCIFEROL (VITAMIN D3) 50 MCG
1 TABLET TABLET ORAL ONCE A DAY
Qty: 90 | Refills: 1 | Status: ACTIVE | COMMUNITY
Start: 2024-06-30 | End: 2024-08-29

## 2024-08-05 RX ORDER — ADALIMUMAB 40MG/0.4ML
INJECT 40MG SUBCUTANEOUSLY  WEEKLY KIT SUBCUTANEOUS
Qty: 4 EACH | Refills: 0 | Status: ACTIVE | COMMUNITY

## 2024-08-05 RX ORDER — OMEPRAZOLE 40 MG/1
TAKE 1 CAPSULE (40 MG) BY ORAL ROUTE ONCE DAILY 30 MINUTES BEFORE BREAKFAST CAPSULE, DELAYED RELEASE PELLETS ORAL 1
Qty: 30 | Refills: 4 | Status: ON HOLD | COMMUNITY
Start: 2019-10-16

## 2024-08-05 RX ORDER — METHYLPREDNISOLONE 4 MG
TAKE AS DIRECTED FOR 6 DAYS TABLET, DOSE PACK ORAL
Qty: 1 | Refills: 0 | Status: ON HOLD | COMMUNITY
Start: 2018-02-15

## 2024-08-05 RX ORDER — OMEPRAZOLE 40 MG/1
1 CAPSULE 30 MINUTES BEFORE MORNING MEAL CAPSULE, DELAYED RELEASE ORAL ONCE A DAY
Qty: 30 | Status: ON HOLD | COMMUNITY
Start: 2023-11-06

## 2024-08-05 RX ORDER — MESALAMINE 1.2 G/1
4 TABLETS WITH MEAL TABLET, DELAYED RELEASE ORAL ONCE A DAY
Status: ACTIVE | COMMUNITY

## 2024-08-05 NOTE — HPI-TODAY'S VISIT:
29 y.o male, established patient with Dr. Arreguin, with a PMHx of Ulcerative Colitis on Humira since 4/12/2019, with weekly dosing  He was last seen in office in 6/17/2024 for continued care on his U.C. He was noted to be doing well on Omeprazole QD for GERD. He had recently been started on Amlodipine for HTN.    He was scheduled for a colonoscopy and orders for  Iron and TIBC, Vitamin B12,  Folate, Ferritin, CRP, Vitamin D, Fecal Calpro, Quantiferon TB, CMP, CBC, TSH, and Hepatitis panel were ordered. Labs were unremarkable, however his Fecal Calprotectin was markedly high at 2310, Vitamin D was low at 15.6, Alk Phos at 133.   He was continues on Humira and Mesalamine 3 tablets QAM, advised to repeat Calprotectin in August and repeat Colonoscopy in December 2024.  Recently patient called and started that he was experiencing diarrhea with chills. C. Diff and GI PCR stool studies were ordered and were negative.  Patient called again with complaints of fever, dry throat, blood-tinged sputum with coughing, and small volume hematochezia. His PCP evaluated him and he tested negative for Covid/Flu, and had anegative UA.  He was prescribed amoxicillin, medrol dosepak, and Betnyl for symtpoms. He reported to have a BM 3x/day while his baseline is 5x/day. His symptoms ghad improving. He was advised to visit ID to see why he cintinues to have recurring URI.   Patient reports that he was treated with Augmentin and Bentyl and a Medrol pack for symptoms when he was evaluated at [facility] on 7/22.  He reports that he finished antibiotics on 30 July.  He reports that all of his symptoms have resolved however he continues to have persistent diarrhea.  He reports that previously he would have 5 bowel movements per day that were formed.  Now he is having 3 softer bowel movements per day. These BM typically occur about 1 hours after a meal.   He denies nocturnal symptoms or incontinence.  He also reports seeing a recurrence of blood in his stool yesterday.  He says it was a small volume of bright red blood mixed in with the stool.  He also reports a new onset of nausea since this past Friday.  He reports that he has a constant sensation that he needs to throw up and has a decreased appetite because of this.  He reports on Friday he had an episode of dry heaving but has not had a recurrence of this or has actually vomited.  He has also been experiencing early satiety since then.  He reports that his last EGD was as a child in [place].  He denies reflux or heartburn or dysphagia.  He denies abdominal pain. ~~ Summary of prior workup: - Colonoscopy by me on 12/15/23: Moderate pancolonic colitis (biopsies confirmed diffuse active colitis) as well as a 4mm desc and 4mm sigm inflammatory pseudopolyp. Repeat colonoscopy advised in 1 year. - GI PCR panel on 11/21/23: negative for any pathogens. Ova and parasites were also negative as was Cyclospora, Cryptosporidium, Isospora and Microsporidia. Fecal inflammatory marker was quite high however at 640. - Colonoscopy by me on 1/18/2023: Normal terminal ileum, diffuse area of mild erythema extending from the transverse to the ascending colon; biopsies consistent with chronic inactive colitis, no granulomas or dysplasia. Mild erythema also noted in the descending and splenic flexure; biopsies consistent with chronic inactive colitis, no granulomas or dysplasia. Moderate nodular mucosa found in the sigmoid colon, conssitent with hyperplastic changes. Normal mucosa in the rectum, biopsies unremarkable. - Labs on 4/18/2022: Sodium 139, potassium 3.9, BUN 10, creatinine 0.7, calcium 9.4, total bilirubin 0.6, alkaline phosphatase 84, total protein 8.0, hemoglobin A1c 5.5%. AST 21, ALT 27, glucose 86, TSH 2.43. WBC 11.9, hemoglobin 17.7, MCV 88, platelets 332. UA negative except for trace blood. - Labs on 6/7/2021 revealed a negative QuantiFERON TB Gold test. Glucose 98, BUN 11, creatinine 0.7, sodium 141, potassium 4.5, calcium 9.5, total protein 7.9, albumin 4.9, total bilirubin 0.6, alkaline phosphatase 99, AST 17, ALT 26. Acute hepatitis panel negative. WBC 8.2, hemoglobin 17.4, MCV 90, platelets 322. - Labs on 12/3/20: Na 139, K 4.0, Cl 101, Gluc 87, BUN 15, C r 0.9, TP 7.9, Alb 4.6, TB 0.7, Ca 9.7, ALT 33, AST 21, AP 90. HbA1c 5.4%, , TSH 1.28, WBC 9.3, Hb 17.2, MCV 90, Plts 327. - Colonoscopy by me on 11/4/20: Normal mucosa thorughout the colon except for mild patchy erythema in the sigm and rectum, as well as a small inflammatory pseudopolyp removed from the rectum. Bxs consistent with inactive colitis. - Bloodwork on 4/18/20 revealed a glucose of 97, BUN 11, creatinine 0.8, sodium 142, potassium 4.2, calcium 9.7, TP 7.9, albumin 4.9, TB 0.7, AP slightly elevated at 123, AST 29, ALT 29. WBC 8.9, hemoglobin 17.6, MCV 89, platelets 354, % saturation 31, normal Fe, negative viral hepatitis panel, negative TB test. Vitamin D low at 17.9. B12 323, ferritin 70, CRP 2. - Bloodwork on 1/17/20 revealed an Adlimumab level of 3.2 while Adalimumab antibody was undetectable. - Colonoscopy by me on 12/11/19 revealed a normal terminal ileum and overall normal mucosa throughout the descending, splenic flexure, transverse, hepatic flexure, ascending and cecum. Biopsies were consistent with ongoing mild active colitis. There was evidence of proctosigmoiditis and a few polyps which were noted to be inflammatory and hyperplastic in nature. - Labs on 7/27/19 revealed a glucose of 96, BUN 11, creatinine 0.7, normal electrolytes except for a CO2 of 19. Calcium 9.6, TP 8.0, albumin 4.8, TB 0.9, AP 85, AST 14, ALT 22. WBC 7.2, hemoglobin 17.1, MCV 95, platelets 275. TB negative. ESR 23. CRP 4. Negtive UA on 7/19/19. - Labs on 3/26/19 revealed a normal CMP  CBC, negative hepatitis panel, QuantiFERON-TB gold test could not be run as the sample was insufficient. TSH 2.57. Positive immunity to hepatitis B but not to hepatitis A. CRP normal at 4.3 however ESR was elevated at 20. - GI PCR panel on 3/11/19 was negative. - Colonoscopy by me on 12/26/18 revealed a normal appearing mucosa throughout without erythema, ulcerations or blood. Random biopsies were consistent with chronic inactive colitis. Around the appendiceal orifice there was some nodularity. Biopsies were unremarkable. There were 2 polyps in the descending colon and 2 polyps in the sigmoid. Path results were consistent with lymphoid aggreagates and HP polyps. - Labs on 10/1/18 revealed his sodium of 141, potassium 3.9, glucose 79, be you and 16, creatinine 0.8, TP 8.2, albumin 4.9, tea be sure. Seven, ALT 26, AP 83, AST 16, HbA1c 5%. Triglycerides 132, LDL 41, HDL 38. TSH 1.8. White blood cell count 6.9, hemoglobin 16.6, MCV 98, platelet count 322,000. UA On 8/10/18 was negative. - GI PCR panel on 6/27/18 was negative. - C diff negative on 2/27/18. - Colonoscopy by Dr. Montesinos on 12/11/17 showed mild pancolitis extending from the TI to the rectum. TI biopsies were normal. Random colon biopsies revealed mild focal active colitis. - Labs on 11/4/17 disclosed a normal CBC except for an MCV of 99, normal CMP and TPMT of 20.5.

## 2024-08-06 PROBLEM — 442076002: Status: ACTIVE | Noted: 2024-08-06

## 2024-08-06 PROBLEM — 422587007: Status: ACTIVE | Noted: 2024-08-06

## 2024-08-09 LAB — CALPROTECTIN, FECAL: 741

## 2024-08-20 ENCOUNTER — WEB ENCOUNTER (OUTPATIENT)
Dept: URBAN - METROPOLITAN AREA CLINIC 78 | Facility: CLINIC | Age: 29
End: 2024-08-20

## 2024-08-20 ENCOUNTER — OFFICE VISIT (OUTPATIENT)
Dept: URBAN - METROPOLITAN AREA CLINIC 78 | Facility: CLINIC | Age: 29
End: 2024-08-20
Payer: COMMERCIAL

## 2024-08-20 VITALS
HEART RATE: 103 BPM | RESPIRATION RATE: 14 BRPM | SYSTOLIC BLOOD PRESSURE: 119 MMHG | DIASTOLIC BLOOD PRESSURE: 87 MMHG | TEMPERATURE: 98.1 F | WEIGHT: 225 LBS | HEIGHT: 72 IN | BODY MASS INDEX: 30.48 KG/M2

## 2024-08-20 DIAGNOSIS — R11.14 BILIOUS VOMITING WITH NAUSEA: ICD-10-CM

## 2024-08-20 DIAGNOSIS — R10.33 PERIUMBILICAL ABDOMINAL PAIN: ICD-10-CM

## 2024-08-20 DIAGNOSIS — K51.80 CHRONIC PANCOLONIC ULCERATIVE COLITIS: ICD-10-CM

## 2024-08-20 DIAGNOSIS — R68.81 EARLY SATIETY: ICD-10-CM

## 2024-08-20 PROCEDURE — 99214 OFFICE O/P EST MOD 30 MIN: CPT

## 2024-08-20 RX ORDER — ONDANSETRON 4 MG/1
1 TABLET ON THE TONGUE AND ALLOW TO DISSOLVE TABLET, ORALLY DISINTEGRATING ORAL
Qty: 30 TABLET | Refills: 0 | OUTPATIENT
Start: 2024-08-20

## 2024-08-20 RX ORDER — SODIUM, POTASSIUM,MAG SULFATES 17.5-3.13G
354 ML SOLUTION, RECONSTITUTED, ORAL ORAL
Qty: 1 | Refills: 0 | Status: ON HOLD | COMMUNITY
Start: 2020-09-03

## 2024-08-20 RX ORDER — MESALAMINE 1.2 G/1
4 TABLETS WITH MEAL TABLET, DELAYED RELEASE ORAL ONCE A DAY
Status: ACTIVE | COMMUNITY

## 2024-08-20 RX ORDER — ADALIMUMAB 40MG/0.4ML
INJECT 40MG SUBCUTANEOUSLY  WEEKLY KIT SUBCUTANEOUS
Qty: 4 EACH | Refills: 0 | Status: ACTIVE | COMMUNITY

## 2024-08-20 RX ORDER — PREDNISONE 10 MG/1
TAKE 4 TABLET BY ORAL ROUTE QD FOR 5 DAYS, THEN 3 TABLETS DAILY FOR 7 DAYS, THEN 2 TABLETS DAILY FOR 7 DAYS, THEN 1 TABLET DAILY FOR 4 DAYS THEN 0.5 TABLETS DAILY FOR 3 DAYS, THEN STOP TABLET ORAL 1
Qty: 70 | Refills: 0 | Status: ON HOLD | COMMUNITY
Start: 2019-03-26

## 2024-08-20 RX ORDER — OMEPRAZOLE 40 MG/1
1 CAPSULE 30 MINUTES BEFORE MORNING MEAL CAPSULE, DELAYED RELEASE ORAL ONCE A DAY
Qty: 90 | Refills: 3 | OUTPATIENT
Start: 2024-08-20

## 2024-08-20 RX ORDER — OMEPRAZOLE 40 MG/1
1 CAPSULE 30 MINUTES BEFORE MORNING MEAL CAPSULE, DELAYED RELEASE ORAL ONCE A DAY
Qty: 30 | Status: ON HOLD | COMMUNITY
Start: 2023-11-06

## 2024-08-20 RX ORDER — OMEPRAZOLE 40 MG/1
TAKE 1 CAPSULE (40 MG) BY ORAL ROUTE ONCE DAILY 30 MINUTES BEFORE BREAKFAST CAPSULE, DELAYED RELEASE PELLETS ORAL 1
Qty: 30 | Refills: 4 | Status: ON HOLD | COMMUNITY
Start: 2019-10-16

## 2024-08-20 RX ORDER — METHYLPREDNISOLONE 4 MG
TAKE AS DIRECTED FOR 6 DAYS TABLET, DOSE PACK ORAL
Qty: 1 | Refills: 0 | Status: ON HOLD | COMMUNITY
Start: 2018-02-15

## 2024-08-20 RX ORDER — DICYCLOMINE HYDROCHLORIDE 20 MG/1
1 TABLET TABLET ORAL THREE TIMES A DAY
Qty: 90 TABLET | Refills: 1 | OUTPATIENT
Start: 2024-08-20 | End: 2024-10-19

## 2024-08-20 RX ORDER — CHOLECALCIFEROL (VITAMIN D3) 50 MCG
1 TABLET TABLET ORAL ONCE A DAY
Qty: 90 | Refills: 1 | Status: ACTIVE | COMMUNITY
Start: 2024-06-30 | End: 2024-08-29

## 2024-08-20 RX ORDER — ONDANSETRON HYDROCHLORIDE 4 MG/1
1 TABLET TABLET, FILM COATED ORAL ONCE A DAY
Qty: 30 | Status: ACTIVE | COMMUNITY
Start: 2024-08-05

## 2024-08-20 NOTE — HPI-TODAY'S VISIT:
29-year-old male, established patient of Dr. Arreguin, with a significant past medical history of ulcerative colitis on Humira and mesalamine which he continues to take as prescribed.   Patient reports today for evaluation of rectal bleeding.  He reports that last week he was experiencing periumbilical abdominal pain that would progressively get worse throughout the day.  He took Bentyl 20 mg and this completely resolved and the pain has not come back.  He reports that 2 weeks ago he was having a variable amount of bright red blood production with defecation.  He reports that the volume of blood depended on the volume of stool, if he produced a lot of stool there would be a lot of blood.  He still complains of early satiety however he denies rectal burning or irritation.  He reports associated symptoms of fecal urgency.   He has not seen blood in his stool again since August 16. He reports after taking the Bentyl last week his abdominal pain, fecal urgency, and hematochezia symptoms resolved.  He did report recurrence of symptoms yesterday evening which lasted from 5 PM until he went to sleep. At this time he had run out of Bentyl so he was not able to take medication for this abdominal pain.  He reports that he woke up this morning and symptoms were gone.  He reports that his abdominal pain felt more like a pressure and he believes that something in his diet is triggering this.     Reports that the Zofran prescribed at last appointment resolved the symptom.  He does feel as though he is sensitive to smells and feels nauseous and wants to throw up with specific smells.  He denies actually vomiting with this.  He does however report that he had a bilious vomiting episode yesterday and associated with abdominal pain and a previous episode on August 11 associated with the same abdominal pain.  He reports that he has 3-4 bowel movements per day that are formed and typically occur postprandially.  He does report that he had an episode of diarrhea yesterday associated with his stomach pain.  He denies reflux or heartburn or dysphagia.  He does report a 10 pound weight loss since the beginning of July, around the time he was hospitalized, until now.  He does also report low-grade fevers measuring at 37-38 Celsius at night however these resolve upon waking up. Aug 15 BW with PCP CBC WBC 16.8 elevated RBC 4.77 Hgb 13.4 Hct 42.3 MCV 88.7 Platelet elevated 436 CMP WNL Sodium 136 Potasiium 3.9 Calcium 9.4 BUN 11 Cratinine 0.95 GLucose 132 T. proitein 8.4 Albumin 3.9 ALk jeff 82 ALT 34 AST 26 T.Bili 0.5

## 2024-08-21 LAB
ALBUMIN: 3.9
ALKALINE PHOSPHATASE: 108
ALT (SGPT): 38
AST (SGOT): 24
BASO (ABSOLUTE): 0.1
BASOS: 1
BILIRUBIN, TOTAL: 0.3
BUN/CREATININE RATIO: 9
BUN: 10
C-REACTIVE PROTEIN, QUANT: 54
CALCIUM: 9.4
CARBON DIOXIDE, TOTAL: 23
CHLORIDE: 104
CREATININE: 1.07
EGFR: 96
EOS (ABSOLUTE): 0.3
EOS: 2
GLOBULIN, TOTAL: 3.9
GLUCOSE: 90
HEMATOCRIT: 41.8
HEMATOLOGY COMMENTS:: (no result)
HEMOGLOBIN: 13.5
IMMATURE CELLS: (no result)
IMMATURE GRANS (ABS): 0.1
IMMATURE GRANULOCYTES: 1
LYMPHS (ABSOLUTE): 8.2
LYMPHS: 46
MCH: 28.1
MCHC: 32.3
MCV: 87
MONOCYTES(ABSOLUTE): 1.1
MONOCYTES: 6
NEUTROPHILS (ABSOLUTE): 7.5
NEUTROPHILS: 44
NRBC: (no result)
PLATELETS: 505
POTASSIUM: 4.9
PROTEIN, TOTAL: 7.8
RBC: 4.8
RDW: 13.1
SEDIMENTATION RATE-WESTERGREN: 75
SODIUM: 143
VITAMIN D, 25-HYDROXY: 20.9
WBC: 17.2

## 2024-08-23 ENCOUNTER — TELEPHONE ENCOUNTER (OUTPATIENT)
Dept: URBAN - METROPOLITAN AREA CLINIC 78 | Facility: CLINIC | Age: 29
End: 2024-08-23

## 2024-08-23 PROBLEM — 304213008: Status: ACTIVE | Noted: 2024-08-23

## 2024-08-23 PROBLEM — 111583006: Status: ACTIVE | Noted: 2024-08-23

## 2024-08-27 LAB
C DIFFICILE TOXIN GENE NAA: NEGATIVE
C DIFFICILE TOXINS A+B, EIA: NEGATIVE
C DIFFICILE, CYTOTOXIN B: (no result)
Lab: (no result)

## 2024-09-16 ENCOUNTER — LAB OUTSIDE AN ENCOUNTER (OUTPATIENT)
Dept: URBAN - METROPOLITAN AREA CLINIC 78 | Facility: CLINIC | Age: 29
End: 2024-09-16

## 2024-09-16 ENCOUNTER — OFFICE VISIT (OUTPATIENT)
Dept: URBAN - METROPOLITAN AREA CLINIC 78 | Facility: CLINIC | Age: 29
End: 2024-09-16
Payer: COMMERCIAL

## 2024-09-16 VITALS
RESPIRATION RATE: 16 BRPM | WEIGHT: 225.4 LBS | BODY MASS INDEX: 30.53 KG/M2 | HEART RATE: 98 BPM | HEIGHT: 72 IN | TEMPERATURE: 98.1 F | DIASTOLIC BLOOD PRESSURE: 92 MMHG | SYSTOLIC BLOOD PRESSURE: 138 MMHG

## 2024-09-16 DIAGNOSIS — R50.9 LOW GRADE FEVER: ICD-10-CM

## 2024-09-16 DIAGNOSIS — R59.0 MESENTERIC LYMPHADENOPATHY: ICD-10-CM

## 2024-09-16 PROCEDURE — 99214 OFFICE O/P EST MOD 30 MIN: CPT

## 2024-09-16 PROCEDURE — 71046 X-RAY EXAM CHEST 2 VIEWS: CPT

## 2024-09-16 RX ORDER — PREDNISONE 10 MG/1
TAKE 4 TABLET BY ORAL ROUTE QD FOR 5 DAYS, THEN 3 TABLETS DAILY FOR 7 DAYS, THEN 2 TABLETS DAILY FOR 7 DAYS, THEN 1 TABLET DAILY FOR 4 DAYS THEN 0.5 TABLETS DAILY FOR 3 DAYS, THEN STOP TABLET ORAL 1
Qty: 70 | Refills: 0 | Status: ON HOLD | COMMUNITY
Start: 2019-03-26

## 2024-09-16 RX ORDER — MESALAMINE 1.2 G/1
4 TABLETS WITH MEAL TABLET, DELAYED RELEASE ORAL ONCE A DAY
Status: ACTIVE | COMMUNITY

## 2024-09-16 RX ORDER — ONDANSETRON HYDROCHLORIDE 4 MG/1
1 TABLET TABLET, FILM COATED ORAL ONCE A DAY
Qty: 30 | Status: ACTIVE | COMMUNITY
Start: 2024-08-05

## 2024-09-16 RX ORDER — ADALIMUMAB 40MG/0.4ML
INJECT 40MG SUBCUTANEOUSLY  WEEKLY KIT SUBCUTANEOUS
Qty: 4 EACH | Refills: 0 | Status: ACTIVE | COMMUNITY

## 2024-09-16 RX ORDER — OMEPRAZOLE 40 MG/1
1 CAPSULE 30 MINUTES BEFORE MORNING MEAL CAPSULE, DELAYED RELEASE ORAL ONCE A DAY
Qty: 90 | Refills: 3 | Status: ACTIVE | COMMUNITY
Start: 2024-08-20

## 2024-09-16 RX ORDER — ONDANSETRON 4 MG/1
1 TABLET ON THE TONGUE AND ALLOW TO DISSOLVE TABLET, ORALLY DISINTEGRATING ORAL
Qty: 30 TABLET | Refills: 0 | Status: ACTIVE | COMMUNITY
Start: 2024-08-20

## 2024-09-16 RX ORDER — METHYLPREDNISOLONE 4 MG
TAKE AS DIRECTED FOR 6 DAYS TABLET, DOSE PACK ORAL
Qty: 1 | Refills: 0 | Status: ON HOLD | COMMUNITY
Start: 2018-02-15

## 2024-09-16 RX ORDER — OMEPRAZOLE 40 MG/1
1 CAPSULE 30 MINUTES BEFORE MORNING MEAL CAPSULE, DELAYED RELEASE ORAL ONCE A DAY
Qty: 30 | Status: ON HOLD | COMMUNITY
Start: 2023-11-06

## 2024-09-16 RX ORDER — SODIUM, POTASSIUM,MAG SULFATES 17.5-3.13G
354 ML SOLUTION, RECONSTITUTED, ORAL ORAL
Qty: 1 | Refills: 0 | Status: ON HOLD | COMMUNITY
Start: 2020-09-03

## 2024-09-16 RX ORDER — OMEPRAZOLE 40 MG/1
TAKE 1 CAPSULE (40 MG) BY ORAL ROUTE ONCE DAILY 30 MINUTES BEFORE BREAKFAST CAPSULE, DELAYED RELEASE PELLETS ORAL 1
Qty: 30 | Refills: 4 | Status: ON HOLD | COMMUNITY
Start: 2019-10-16

## 2024-09-16 RX ORDER — DICYCLOMINE HYDROCHLORIDE 20 MG/1
1 TABLET TABLET ORAL THREE TIMES A DAY
Qty: 90 TABLET | Refills: 1 | Status: ACTIVE | COMMUNITY
Start: 2024-08-20 | End: 2024-10-19

## 2024-09-16 NOTE — HPI-TODAY'S VISIT:
29 y.o M, [facility] patient of Dr. Arreguin  He followed up with ID Dr. Sandra San MD last Monday. he reports that their workup  consisting fo blood work and a "blood clotting test" where they placed his blood into a bottle and it drained into the bottom, was all negative, however WBC were going down, but they were still elevated.  She reports that the I.D Doctor told him that his fevers were just his body reacting to the U.C.  He is unsure about the fever that he reported last time. He says that it is intermittent, but does not occur everday. This is actually the only symptoms that persists. He only had these fevers abousiust 2x last week. His last fever was this past Saturday, it was 37.2 Celsius.  He does not take naything to help this go away, he just sleeps it off and when he wakes up it is resolved  He no longer has nausea and vomiting. He still has mild early satiety, but not as bad as before. He reports that this has imporved sincel ast appointment. He denies dysphagia, abdominal pain. His BM are normal, 2x a day, solid stool, no blood or mucus.  He denies any issue with urinating.  He denies any SOB or CP. He no longer takes Zofran, and he does not need to take the Bentyl. He continues to take the omeprazole 40mg QD as rx at last appointment.

## 2024-09-22 LAB
APPEARANCE: CLEAR
BACTERIA: (no result)
BILIRUBIN: NEGATIVE
CULTURE: (no result)
GLUCOSE: NEGATIVE
HYALINE CAST: (no result)
KETONES: NEGATIVE
NITRITE, URINE: NEGATIVE
OCCULT BLOOD: (no result)
PH: 6.5
PROTEIN: (no result)
RBC: (no result)
SPECIFIC GRAVITY: 1.01
SQUAMOUS EPITHELIAL CELLS: (no result)
URINE-COLOR: YELLOW
WBC ESTERASE: (no result)
WBC: (no result)

## 2024-09-23 ENCOUNTER — TELEPHONE ENCOUNTER (OUTPATIENT)
Dept: URBAN - METROPOLITAN AREA CLINIC 78 | Facility: CLINIC | Age: 29
End: 2024-09-23

## 2024-09-23 PROBLEM — 102866000: Status: ACTIVE | Noted: 2024-09-23

## 2024-09-30 ENCOUNTER — LAB OUTSIDE AN ENCOUNTER (OUTPATIENT)
Dept: URBAN - METROPOLITAN AREA CLINIC 78 | Facility: CLINIC | Age: 29
End: 2024-09-30

## 2024-11-22 ENCOUNTER — TELEPHONE ENCOUNTER (OUTPATIENT)
Dept: URBAN - METROPOLITAN AREA CLINIC 78 | Facility: CLINIC | Age: 29
End: 2024-11-22

## 2024-11-22 RX ORDER — MESALAMINE 1.2 G/1
4 TABLETS WITH MEAL TABLET, DELAYED RELEASE ORAL ONCE A DAY
Qty: 120 TABLET | Refills: 5

## 2024-11-25 ENCOUNTER — ERX REFILL RESPONSE (OUTPATIENT)
Dept: URBAN - METROPOLITAN AREA CLINIC 78 | Facility: CLINIC | Age: 29
End: 2024-11-25

## 2024-11-25 RX ORDER — MESALAMINE 1.2 G/1
TAKE 4 TABLETS BY MOUTH DAILY WITH MEAL TABLET, DELAYED RELEASE ORAL
Qty: 360 TABLET | Refills: 0 | OUTPATIENT

## 2024-11-25 RX ORDER — MESALAMINE 1.2 G/1
4 TABLETS WITH MEAL TABLET, DELAYED RELEASE ORAL ONCE A DAY
Qty: 120 TABLET | Refills: 5 | OUTPATIENT

## 2024-12-13 ENCOUNTER — CLAIMS CREATED FROM THE CLAIM WINDOW (OUTPATIENT)
Dept: URBAN - METROPOLITAN AREA CLINIC 4 | Facility: CLINIC | Age: 29
End: 2024-12-13
Payer: COMMERCIAL

## 2024-12-13 ENCOUNTER — CLAIMS CREATED FROM THE CLAIM WINDOW (OUTPATIENT)
Dept: URBAN - METROPOLITAN AREA SURGERY CENTER 15 | Facility: SURGERY CENTER | Age: 29
End: 2024-12-13
Payer: COMMERCIAL

## 2024-12-13 DIAGNOSIS — K31.89 OTHER DISEASES OF STOMACH AND DUODENUM: ICD-10-CM

## 2024-12-13 DIAGNOSIS — K29.60 OTHER GASTRITIS WITHOUT BLEEDING: ICD-10-CM

## 2024-12-13 DIAGNOSIS — K51.40 PSEUDOPOLYPOSIS OF COLON WITHOUT COMPLICATION, UNSPECIFIED PART OF COLON: ICD-10-CM

## 2024-12-13 DIAGNOSIS — R68.81 EARLY SATIETY: ICD-10-CM

## 2024-12-13 DIAGNOSIS — R10.84 GENERALIZED ABDOMINAL PAIN: ICD-10-CM

## 2024-12-13 DIAGNOSIS — R50.81 FEVER IN OTHER DISEASES: ICD-10-CM

## 2024-12-13 DIAGNOSIS — R11.0 NAUSEA: ICD-10-CM

## 2024-12-13 DIAGNOSIS — K51.018 ULCERATIVE (CHRONIC) PANCOLITIS WITH OTHER COMPLICATION: ICD-10-CM

## 2024-12-13 DIAGNOSIS — K21.9 GASTRO-ESOPHAGEAL REFLUX DISEASE WITHOUT ESOPHAGITIS: ICD-10-CM

## 2024-12-13 DIAGNOSIS — K22.89 OTHER SPECIFIED DISEASE OF ESOPHAGUS: ICD-10-CM

## 2024-12-13 DIAGNOSIS — K63.5 POLYP OF TRANSVERSE COLON, UNSPECIFIED TYPE: ICD-10-CM

## 2024-12-13 DIAGNOSIS — K51.00 ULCERATIVE PANCOLITIS: ICD-10-CM

## 2024-12-13 DIAGNOSIS — K51.919 ULCERATIVE COLITIS, UNSPECIFIED WITH UNSPECIFIED COMPLICATIONS: ICD-10-CM

## 2024-12-13 PROCEDURE — 45380 COLONOSCOPY AND BIOPSY: CPT | Performed by: INTERNAL MEDICINE

## 2024-12-13 PROCEDURE — 88312 SPECIAL STAINS GROUP 1: CPT | Performed by: PATHOLOGY

## 2024-12-13 PROCEDURE — 43239 EGD BIOPSY SINGLE/MULTIPLE: CPT | Performed by: INTERNAL MEDICINE

## 2024-12-13 PROCEDURE — 88305 TISSUE EXAM BY PATHOLOGIST: CPT | Performed by: PATHOLOGY

## 2024-12-13 PROCEDURE — 00813 ANES UPR LWR GI NDSC PX: CPT | Performed by: NURSE ANESTHETIST, CERTIFIED REGISTERED

## 2024-12-13 PROCEDURE — 88342 IMHCHEM/IMCYTCHM 1ST ANTB: CPT | Performed by: PATHOLOGY

## 2024-12-13 RX ORDER — ONDANSETRON 4 MG/1
1 TABLET ON THE TONGUE AND ALLOW TO DISSOLVE TABLET, ORALLY DISINTEGRATING ORAL
Qty: 30 TABLET | Refills: 0 | Status: ACTIVE | COMMUNITY
Start: 2024-08-20

## 2024-12-13 RX ORDER — ADALIMUMAB 40MG/0.4ML
INJECT 40MG SUBCUTANEOUSLY  WEEKLY KIT SUBCUTANEOUS
Qty: 4 EACH | Refills: 0 | Status: ACTIVE | COMMUNITY

## 2024-12-13 RX ORDER — SODIUM, POTASSIUM,MAG SULFATES 17.5-3.13G
354 ML SOLUTION, RECONSTITUTED, ORAL ORAL
Qty: 1 | Refills: 0 | Status: ON HOLD | COMMUNITY
Start: 2020-09-03

## 2024-12-13 RX ORDER — OMEPRAZOLE 40 MG/1
TAKE 1 CAPSULE (40 MG) BY ORAL ROUTE ONCE DAILY 30 MINUTES BEFORE BREAKFAST CAPSULE, DELAYED RELEASE PELLETS ORAL 1
Qty: 30 | Refills: 4 | Status: ON HOLD | COMMUNITY
Start: 2019-10-16

## 2024-12-13 RX ORDER — MESALAMINE 1.2 G/1
TAKE 4 TABLETS BY MOUTH DAILY WITH MEAL TABLET, DELAYED RELEASE ORAL
Qty: 360 TABLET | Refills: 0 | Status: ACTIVE | COMMUNITY

## 2024-12-13 RX ORDER — PREDNISONE 10 MG/1
TAKE 4 TABLET BY ORAL ROUTE QD FOR 5 DAYS, THEN 3 TABLETS DAILY FOR 7 DAYS, THEN 2 TABLETS DAILY FOR 7 DAYS, THEN 1 TABLET DAILY FOR 4 DAYS THEN 0.5 TABLETS DAILY FOR 3 DAYS, THEN STOP TABLET ORAL 1
Qty: 70 | Refills: 0 | Status: ON HOLD | COMMUNITY
Start: 2019-03-26

## 2024-12-13 RX ORDER — METHYLPREDNISOLONE 4 MG
TAKE AS DIRECTED FOR 6 DAYS TABLET, DOSE PACK ORAL
Qty: 1 | Refills: 0 | Status: ON HOLD | COMMUNITY
Start: 2018-02-15

## 2024-12-13 RX ORDER — ONDANSETRON HYDROCHLORIDE 4 MG/1
1 TABLET TABLET, FILM COATED ORAL ONCE A DAY
Qty: 30 | Status: ACTIVE | COMMUNITY
Start: 2024-08-05

## 2024-12-13 RX ORDER — OMEPRAZOLE 40 MG/1
1 CAPSULE 30 MINUTES BEFORE MORNING MEAL CAPSULE, DELAYED RELEASE ORAL ONCE A DAY
Qty: 90 | Refills: 3 | Status: ACTIVE | COMMUNITY
Start: 2024-08-20

## 2024-12-13 RX ORDER — OMEPRAZOLE 40 MG/1
1 CAPSULE 30 MINUTES BEFORE MORNING MEAL CAPSULE, DELAYED RELEASE ORAL ONCE A DAY
Qty: 30 | Status: ON HOLD | COMMUNITY
Start: 2023-11-06

## 2025-01-13 ENCOUNTER — OFFICE VISIT (OUTPATIENT)
Dept: URBAN - METROPOLITAN AREA CLINIC 78 | Facility: CLINIC | Age: 30
End: 2025-01-13
Payer: COMMERCIAL

## 2025-01-13 VITALS
TEMPERATURE: 98 F | SYSTOLIC BLOOD PRESSURE: 131 MMHG | HEART RATE: 81 BPM | BODY MASS INDEX: 33.46 KG/M2 | WEIGHT: 247 LBS | HEIGHT: 72 IN | DIASTOLIC BLOOD PRESSURE: 100 MMHG

## 2025-01-13 DIAGNOSIS — D75.89 RED BLOOD CELL DISORDER: ICD-10-CM

## 2025-01-13 DIAGNOSIS — I10 HTN (HYPERTENSION), BENIGN: ICD-10-CM

## 2025-01-13 DIAGNOSIS — E66.9 OBESITY (BMI 30-39.9): ICD-10-CM

## 2025-01-13 DIAGNOSIS — K51.90 ULCERATIVE COLITIS: ICD-10-CM

## 2025-01-13 DIAGNOSIS — R12 HEARTBURN: ICD-10-CM

## 2025-01-13 DIAGNOSIS — R14.0 GASSINESS: ICD-10-CM

## 2025-01-13 PROCEDURE — 99214 OFFICE O/P EST MOD 30 MIN: CPT | Performed by: INTERNAL MEDICINE

## 2025-01-13 RX ORDER — SODIUM, POTASSIUM,MAG SULFATES 17.5-3.13G
354 ML SOLUTION, RECONSTITUTED, ORAL ORAL
Qty: 1 | Refills: 0 | Status: ON HOLD | COMMUNITY
Start: 2020-09-03

## 2025-01-13 RX ORDER — OMEPRAZOLE 40 MG/1
1 CAPSULE 30 MINUTES BEFORE MORNING MEAL CAPSULE, DELAYED RELEASE ORAL ONCE A DAY
Qty: 90 | Refills: 3 | Status: ACTIVE | COMMUNITY
Start: 2024-08-20

## 2025-01-13 RX ORDER — OMEPRAZOLE 40 MG/1
1 CAPSULE 30 MINUTES BEFORE MORNING MEAL CAPSULE, DELAYED RELEASE ORAL ONCE A DAY
Qty: 30 | Status: ON HOLD | COMMUNITY
Start: 2023-11-06

## 2025-01-13 RX ORDER — ADALIMUMAB 40MG/0.4ML
INJECT 40MG SUBCUTANEOUSLY  WEEKLY KIT SUBCUTANEOUS
Qty: 4 EACH | Refills: 0 | Status: ACTIVE | COMMUNITY

## 2025-01-13 RX ORDER — OMEPRAZOLE 40 MG/1
TAKE 1 CAPSULE (40 MG) BY ORAL ROUTE ONCE DAILY 30 MINUTES BEFORE BREAKFAST CAPSULE, DELAYED RELEASE PELLETS ORAL 1
Qty: 30 | Refills: 4 | Status: ON HOLD | COMMUNITY
Start: 2019-10-16

## 2025-01-13 RX ORDER — MESALAMINE 1.2 G/1
TAKE 4 TABLETS BY MOUTH DAILY WITH MEAL TABLET, DELAYED RELEASE ORAL
Qty: 360 TABLET | Refills: 0 | Status: ACTIVE | COMMUNITY

## 2025-01-13 RX ORDER — ONDANSETRON HYDROCHLORIDE 4 MG/1
1 TABLET TABLET, FILM COATED ORAL ONCE A DAY
Qty: 30 | Status: ACTIVE | COMMUNITY
Start: 2024-08-05

## 2025-01-13 RX ORDER — ONDANSETRON 4 MG/1
1 TABLET ON THE TONGUE AND ALLOW TO DISSOLVE TABLET, ORALLY DISINTEGRATING ORAL
Qty: 30 TABLET | Refills: 0 | Status: ACTIVE | COMMUNITY
Start: 2024-08-20

## 2025-01-13 RX ORDER — METHYLPREDNISOLONE 4 MG
TAKE AS DIRECTED FOR 6 DAYS TABLET, DOSE PACK ORAL
Qty: 1 | Refills: 0 | Status: ON HOLD | COMMUNITY
Start: 2018-02-15

## 2025-01-13 RX ORDER — MESALAMINE 1.2 G/1
TAKE 4 TABLETS BY MOUTH DAILY WITH MEAL TABLET, DELAYED RELEASE ORAL ONCE A DAY
Qty: 360 TABLET | Refills: 5

## 2025-01-13 RX ORDER — PREDNISONE 10 MG/1
TAKE 4 TABLET BY ORAL ROUTE QD FOR 5 DAYS, THEN 3 TABLETS DAILY FOR 7 DAYS, THEN 2 TABLETS DAILY FOR 7 DAYS, THEN 1 TABLET DAILY FOR 4 DAYS THEN 0.5 TABLETS DAILY FOR 3 DAYS, THEN STOP TABLET ORAL 1
Qty: 70 | Refills: 0 | Status: ON HOLD | COMMUNITY
Start: 2019-03-26

## 2025-01-13 NOTE — HPI-TODAY'S VISIT:
I have been following the patient for Ulcerative colitis.  I started him on Humira on 4/12/19. He has been off Mesalamine for several years now,  He had been doing overall well, but lately has had a lot of issues with active disease, manifesting both as GI issues/systemic symptoms and endoscopic findings of colitis.   Both in April and May 2023 he had an URI. He had to be treated with Azithromycin and Methylprednisolone.  He also had to see Christelle Wade for an abscess in the upper thigh region. He was treated with Doxycycline X 2. These have  not recurred.  He has also recently seen ID (Dr. Sandra San) for leukocytosis and fevers, which were felt to be reactive to his UC.  He currently denies nausea and vomiting. He still has mild early satiety. Denies dysphagia, abdominal pain. His BM are ~ 2x a day, solid stool, no blood or mucus.  He is using Omeprazole daily which he feels has been helping with his GERD.   He was diagnosed with HTN. He was started on Amlodipine.   Today we reviewed the results of his recent colonoscopy/path.   Summary of prior workup: - Colonoscopy by me on 12/15/23: Moderate pancolonic colitis (biopsies confirmed diffuse active colitis) as well as a 4mm desc and 4mm sigm inflammatory pseudopolyp. Repeat colonoscopy advised in 1 year. - GI PCR panel on 11/21/23: negative for any pathogens. Ova and parasites were also negative as was Cyclospora, Cryptosporidium, Isospora and Microsporidia. Fecal inflammatory marker was quite high however at 640. - Colonoscopy by me on 1/18/2023: Normal terminal ileum, diffuse area of mild erythema extending from the transverse to the ascending colon; biopsies consistent with chronic inactive colitis, no granulomas or dysplasia. Mild erythema also noted in the descending and splenic flexure; biopsies consistent with chronic inactive colitis, no granulomas or dysplasia.  Moderate nodular mucosa found in the sigmoid colon, conssitent with hyperplastic changes.  Normal mucosa in the rectum, biopsies unremarkable. - Labs on 4/18/2022: Sodium 139, potassium 3.9, BUN 10, creatinine 0.7, calcium 9.4, total bilirubin 0.6, alkaline phosphatase 84, total protein 8.0, hemoglobin A1c 5.5%.  AST 21, ALT 27, glucose 86, TSH 2.43.  WBC 11.9, hemoglobin 17.7, MCV 88, platelets 332.  UA negative except for trace blood.   - Labs on 6/7/2021 revealed a negative QuantiFERON TB Gold test.  Glucose 98, BUN 11, creatinine 0.7, sodium 141, potassium 4.5, calcium 9.5, total protein 7.9, albumin 4.9, total bilirubin 0.6, alkaline phosphatase 99, AST 17, ALT 26.  Acute hepatitis panel negative.  WBC 8.2, hemoglobin 17.4, MCV 90, platelets 322. - Labs on 12/3/20: Na 139, K 4.0, Cl 101, Gluc 87, BUN 15, C r 0.9, TP 7.9, Alb 4.6, TB 0.7, Ca 9.7, ALT 33, AST 21, AP 90. HbA1c 5.4%, , TSH 1.28, WBC 9.3, Hb 17.2, MCV 90, Plts 327.  - Colonoscopy by me on 11/4/20: Normal mucosa thorughout the colon except for mild patchy erythema in the sigm and rectum, as well as a small inflammatory pseudopolyp removed from the rectum. Bxs consistent with inactive colitis.   - Bloodwork on 4/18/20 revealed a glucose of 97, BUN 11, creatinine 0.8, sodium 142, potassium 4.2, calcium 9.7, TP 7.9, albumin 4.9, TB 0.7, AP slightly elevated at 123, AST 29, ALT 29. WBC 8.9, hemoglobin 17.6, MCV 89, platelets 354, % saturation 31, normal Fe, negative viral hepatitis panel, negative TB test. Vitamin D low at 17.9. B12 323, ferritin 70, CRP 2. - Bloodwork on 1/17/20 revealed an Adlimumab level of 3.2 while Adalimumab antibody was undetectable. - Colonoscopy by me on 12/11/19 revealed a normal terminal ileum and overall normal mucosa throughout the descending, splenic flexure, transverse, hepatic flexure, ascending and cecum. Biopsies were consistent with ongoing mild active colitis. There was evidence of proctosigmoiditis and a few polyps which were noted to be inflammatory and hyperplastic in nature. - Labs on 7/27/19 revealed a glucose of 96, BUN 11, creatinine 0.7, normal electrolytes except for a CO2 of 19. Calcium 9.6, TP 8.0, albumin 4.8, TB 0.9, AP 85, AST 14, ALT 22. WBC 7.2, hemoglobin 17.1, MCV 95, platelets 275. TB negative. ESR 23. CRP 4. Negtive UA on 7/19/19. - Labs on 3/26/19 revealed a normal CMP &CBC, negative hepatitis panel, QuantiFERON-TB gold test could not be run as the sample was insufficient. TSH 2.57. Positive immunity to hepatitis B but not to hepatitis A. CRP normal at 4.3 however ESR was elevated at 20. - GI PCR panel on 3/11/19 was negative.  - Colonoscopy by me on 12/26/18 revealed a normal appearing mucosa throughout without erythema, ulcerations or blood. Random biopsies were consistent with chronic inactive colitis. Around the appendiceal orifice there was some nodularity. Biopsies were unremarkable. There were 2 polyps in the descending colon and 2 polyps in the sigmoid. Path results were consistent with lymphoid aggreagates and HP polyps.  - Labs on 10/1/18 revealed his sodium of 141, potassium 3.9, glucose 79, be you and 16, creatinine 0.8, TP 8.2, albumin 4.9, tea be sure. Seven, ALT 26, AP 83, AST 16, HbA1c 5%. Triglycerides 132, LDL 41, HDL 38. TSH 1.8. White blood cell count 6.9, hemoglobin 16.6, MCV 98, platelet count 322,000. UA On 8/10/18 was negative. - GI PCR panel on 6/27/18 was negative. - C diff negative on 2/27/18. - Colonoscopy by Dr. Montesinos on 12/11/17 showed mild pancolitis extending from the TI to the rectum. TI biopsies were normal. Random colon biopsies revealed mild focal active colitis. - Labs on 11/4/17 disclosed a normal CBC except for an MCV of 99, normal CMP and TPMT of 20.5 I have been following the patient for Ulcerative colitis.  I started him on Humira on 4/12/19. He takes his injections once weekly. He has not had any side effects. He has been off Mesalamine for several years now,  He is doing a bit better. He has noted now more formed stools and has not seen any further blood. He has also noticed a bit of improvement in the abdominal pain.  He is using Omeprazole daily which he feels has been helping with his GERD.  He is having cough again and hoarseness for which reason he will be seeing his PCP tangela Olson: He followed up with ID Dr. Sandra San MD last Monday. he reports that their workup  consisting fo blood work and a "blood clotting test" where they placed his blood into a bottle and it drained into the bottom, was all negative, however WBC were going down, but they were still elevated.  She reports that the I.D Doctor told him that his fevers were just his body reacting to the U.C. He is unsure about the fever that he reported last time. He says that it is intermittent, but does not occur everday. This is actually the only symptoms that persists. He only had these fevers abousiust 2x last week. His last fever was this past Saturday, it was 37.2 Celsius.  He does not take naything to help this go away, he just sleeps it off and when he wakes up it is resolved He no longer has nausea and vomiting. He still has mild early satiety, but not as bad as before. He reports that this has imporved sincel ast appointment. He denies dysphagia, abdominal pain. His BM are normal, 2x a day, solid stool, no blood or mucus. He denies any issue with urinating.  He denies any SOB or CP. He no longer takes Zofran, and he does not need to take the Bentyl. He continues to take the omeprazole 40mg QD as rx at last appointment.  He has been using the Humira weekly without any AEs. He denies site reactions.  Both in April and May 2023 he had an URI. He had to be treated with Azithromycin and Methylprednisolone.  He also had to see Christelle Wade for an abscess in the upper thigh region. He was treated with Doxycycline X 2.   Today we reviewed the results of his recent colonoscopy/path.  He has been avoiding soda.  He was recently diagnosed with HTN. He was started on Amlodipine.   Summary of prior workup: - EGDColonoscopy on12/13/2024: Normal esophagus (bxs with reflux type changes). Regular Z-line,  normal mucosa throughout the stomach (no H pylori).  Normal duodenum (biopsies unremarkable).  Colonoscopy with moderate pancolitis (bxs consistent with diffuse chronic active colitis, no infection, dysplasia or malignancy).  2 diminutive inflammatory polyps in the transverse and 3 diminutive inflammatory pseudopolyps in the splenic flexure.  Repeat colonoscopy was advised in 1 year. - E/C on 12/13/2024: Normal upper GI tract.  Biopsies consistent with reflux type changes, chronic inactive gastritis and normal duodenum.  On colonoscopy there was evidence of moderate pancolitis as well as a few inflammatory pseudopolyps  in the transverse and splenic flexure. Biopsies throughout the colon showed diffuse chronic active colitis throughout. - Labs on 9/14/2024: WBC 13.8, hemoglobin 12.9, MCV 88, platelets 413.  TB Gold test negative blood culture no growth.  - Colonoscopy by me on 12/15/23: Moderate pancolonic colitis (biopsies confirmed diffuse active colitis) as well as a 4mm desc and 4mm sigm inflammatory pseudopolyp. Repeat colonoscopy advised in 1 year. - GI PCR panel on 11/21/23: negative for any pathogens. Ova and parasites were also negative as was Cyclospora, Cryptosporidium, Isospora and Microsporidia. Fecal inflammatory marker was quite high however at 640. - Colonoscopy by me on 1/18/2023: Normal terminal ileum, diffuse area of mild erythema extending from the transverse to the ascending colon; biopsies consistent with chronic inactive colitis, no granulomas or dysplasia. Mild erythema also noted in the descending and splenic flexure; biopsies consistent with chronic inactive colitis, no granulomas or dysplasia.  Moderate nodular mucosa found in the sigmoid colon, conssitent with hyperplastic changes.  Normal mucosa in the rectum, biopsies unremarkable. - Labs on 4/18/2022: Sodium 139, potassium 3.9, BUN 10, creatinine 0.7, calcium 9.4, total bilirubin 0.6, alkaline phosphatase 84, total protein 8.0, hemoglobin A1c 5.5%.  AST 21, ALT 27, glucose 86, TSH 2.43.  WBC 11.9, hemoglobin 17.7, MCV 88, platelets 332.  UA negative except for trace blood.   - Labs on 6/7/2021 revealed a negative QuantiFERON TB Gold test.  Glucose 98, BUN 11, creatinine 0.7, sodium 141, potassium 4.5, calcium 9.5, total protein 7.9, albumin 4.9, total bilirubin 0.6, alkaline phosphatase 99, AST 17, ALT 26.  Acute hepatitis panel negative.  WBC 8.2, hemoglobin 17.4, MCV 90, platelets 322. - Labs on 12/3/20: Na 139, K 4.0, Cl 101, Gluc 87, BUN 15, C r 0.9, TP 7.9, Alb 4.6, TB 0.7, Ca 9.7, ALT 33, AST 21, AP 90. HbA1c 5.4%, , TSH 1.28, WBC 9.3, Hb 17.2, MCV 90, Plts 327.  - Colonoscopy by me on 11/4/20: Normal mucosa thorughout the colon except for mild patchy erythema in the sigm and rectum, as well as a small inflammatory pseudopolyp removed from the rectum. Bxs consistent with inactive colitis.   - Bloodwork on 4/18/20 revealed a glucose of 97, BUN 11, creatinine 0.8, sodium 142, potassium 4.2, calcium 9.7, TP 7.9, albumin 4.9, TB 0.7, AP slightly elevated at 123, AST 29, ALT 29. WBC 8.9, hemoglobin 17.6, MCV 89, platelets 354, % saturation 31, normal Fe, negative viral hepatitis panel, negative TB test. Vitamin D low at 17.9. B12 323, ferritin 70, CRP 2. - Bloodwork on 1/17/20 revealed an Adlimumab level of 3.2 while Adalimumab antibody was undetectable. - Colonoscopy by me on 12/11/19 revealed a normal terminal ileum and overall normal mucosa throughout the descending, splenic flexure, transverse, hepatic flexure, ascending and cecum. Biopsies were consistent with ongoing mild active colitis. There was evidence of proctosigmoiditis and a few polyps which were noted to be inflammatory and hyperplastic in nature. - Labs on 7/27/19 revealed a glucose of 96, BUN 11, creatinine 0.7, normal electrolytes except for a CO2 of 19. Calcium 9.6, TP 8.0, albumin 4.8, TB 0.9, AP 85, AST 14, ALT 22. WBC 7.2, hemoglobin 17.1, MCV 95, platelets 275. TB negative. ESR 23. CRP 4. Negtive UA on 7/19/19. - Labs on 3/26/19 revealed a normal CMP &CBC, negative hepatitis panel, QuantiFERON-TB gold test could not be run as the sample was insufficient. TSH 2.57. Positive immunity to hepatitis B but not to hepatitis A. CRP normal at 4.3 however ESR was elevated at 20. - GI PCR panel on 3/11/19 was negative.  - Colonoscopy by me on 12/26/18 revealed a normal appearing mucosa throughout without erythema, ulcerations or blood. Random biopsies were consistent with chronic inactive colitis. Around the appendiceal orifice there was some nodularity. Biopsies were unremarkable. There were 2 polyps in the descending colon and 2 polyps in the sigmoid. Path results were consistent with lymphoid aggreagates and HP polyps.  - Labs on 10/1/18 revealed his sodium of 141, potassium 3.9, glucose 79, be you and 16, creatinine 0.8, TP 8.2, albumin 4.9, tea be sure. Seven, ALT 26, AP 83, AST 16, HbA1c 5%. Triglycerides 132, LDL 41, HDL 38. TSH 1.8. White blood cell count 6.9, hemoglobin 16.6, MCV 98, platelet count 322,000. UA On 8/10/18 was negative. - GI PCR panel on 6/27/18 was negative. - C diff negative on 2/27/18. - Colonoscopy by Dr. Montesinos on 12/11/17 showed mild pancolitis extending from the TI to the rectum. TI biopsies were normal. Random colon biopsies revealed mild focal active colitis. - Labs on 11/4/17 disclosed a normal CBC except for an MCV of 99, normal CMP and TPMT of 20.5

## 2025-01-16 ENCOUNTER — TELEPHONE ENCOUNTER (OUTPATIENT)
Dept: URBAN - METROPOLITAN AREA CLINIC 78 | Facility: CLINIC | Age: 30
End: 2025-01-16

## 2025-01-16 RX ORDER — GUSELKUMAB 200 MG/20ML
20 ML FOR 3 DOSES INJECTION INTRAVENOUS
Qty: 3 | Refills: 0 | OUTPATIENT
Start: 2025-01-17 | End: 2025-02-16

## 2025-01-16 RX ORDER — GUSELKUMAB 200 MG/2ML
2 ML INJECTION SUBCUTANEOUS
Qty: 2 PEN NEEDLE | Refills: 3 | OUTPATIENT
Start: 2025-01-17 | End: 2025-05-17

## 2025-01-19 ENCOUNTER — P2P PATIENT RECORD (OUTPATIENT)
Age: 30
End: 2025-01-19

## 2025-02-21 ENCOUNTER — OFFICE VISIT (OUTPATIENT)
Dept: URBAN - METROPOLITAN AREA CLINIC 77 | Facility: CLINIC | Age: 30
End: 2025-02-21
Payer: COMMERCIAL

## 2025-02-21 VITALS
HEIGHT: 72 IN | SYSTOLIC BLOOD PRESSURE: 129 MMHG | WEIGHT: 246.2 LBS | TEMPERATURE: 98.6 F | RESPIRATION RATE: 20 BRPM | BODY MASS INDEX: 33.35 KG/M2 | DIASTOLIC BLOOD PRESSURE: 95 MMHG

## 2025-02-21 DIAGNOSIS — K51.90 ULCERATIVE COLITIS: ICD-10-CM

## 2025-02-21 PROCEDURE — 96413 CHEMO IV INFUSION 1 HR: CPT | Performed by: INTERNAL MEDICINE

## 2025-02-21 RX ORDER — METHYLPREDNISOLONE 4 MG/1
TAKE AS DIRECTED FOR 6 DAYS TABLET ORAL
Qty: 1 | Refills: 0 | Status: ON HOLD | COMMUNITY
Start: 2018-02-15

## 2025-02-21 RX ORDER — ONDANSETRON HYDROCHLORIDE 4 MG/1
1 TABLET TABLET, FILM COATED ORAL ONCE A DAY
Qty: 30 | Status: ACTIVE | COMMUNITY
Start: 2024-08-05

## 2025-02-21 RX ORDER — PREDNISONE 10 MG/1
TAKE 4 TABLET BY ORAL ROUTE QD FOR 5 DAYS, THEN 3 TABLETS DAILY FOR 7 DAYS, THEN 2 TABLETS DAILY FOR 7 DAYS, THEN 1 TABLET DAILY FOR 4 DAYS THEN 0.5 TABLETS DAILY FOR 3 DAYS, THEN STOP TABLET ORAL 1
Qty: 70 | Refills: 0 | Status: ON HOLD | COMMUNITY
Start: 2019-03-26

## 2025-02-21 RX ORDER — OMEPRAZOLE 40 MG/1
TAKE 1 CAPSULE (40 MG) BY ORAL ROUTE ONCE DAILY 30 MINUTES BEFORE BREAKFAST CAPSULE, DELAYED RELEASE PELLETS ORAL 1
Qty: 30 | Refills: 4 | Status: ON HOLD | COMMUNITY
Start: 2019-10-16

## 2025-02-21 RX ORDER — ONDANSETRON 4 MG/1
1 TABLET ON THE TONGUE AND ALLOW TO DISSOLVE TABLET, ORALLY DISINTEGRATING ORAL
Qty: 30 TABLET | Refills: 0 | Status: ACTIVE | COMMUNITY
Start: 2024-08-20

## 2025-02-21 RX ORDER — MESALAMINE 1.2 G/1
TAKE 4 TABLETS BY MOUTH DAILY WITH MEAL TABLET, DELAYED RELEASE ORAL ONCE A DAY
Qty: 360 TABLET | Refills: 5 | Status: ACTIVE | COMMUNITY

## 2025-02-21 RX ORDER — OMEPRAZOLE 40 MG/1
1 CAPSULE 30 MINUTES BEFORE MORNING MEAL CAPSULE, DELAYED RELEASE ORAL ONCE A DAY
Qty: 30 | Status: ON HOLD | COMMUNITY
Start: 2023-11-06

## 2025-02-21 RX ORDER — GUSELKUMAB 200 MG/2ML
2 ML INJECTION SUBCUTANEOUS
Qty: 2 PEN NEEDLE | Refills: 3 | Status: ACTIVE | COMMUNITY
Start: 2025-01-17 | End: 2025-05-17

## 2025-02-21 RX ORDER — OMEPRAZOLE 40 MG/1
1 CAPSULE 30 MINUTES BEFORE MORNING MEAL CAPSULE, DELAYED RELEASE ORAL ONCE A DAY
Qty: 90 | Refills: 3 | Status: ACTIVE | COMMUNITY
Start: 2024-08-20

## 2025-02-21 RX ORDER — ADALIMUMAB 40MG/0.4ML
INJECT 40MG SUBCUTANEOUSLY  WEEKLY KIT SUBCUTANEOUS
Qty: 4 EACH | Refills: 0 | Status: ACTIVE | COMMUNITY

## 2025-02-21 RX ORDER — SODIUM, POTASSIUM,MAG SULFATES 17.5-3.13G
354 ML SOLUTION, RECONSTITUTED, ORAL ORAL
Qty: 1 | Refills: 0 | Status: ON HOLD | COMMUNITY
Start: 2020-09-03

## 2025-02-26 ENCOUNTER — P2P PATIENT RECORD (OUTPATIENT)
Age: 30
End: 2025-02-26

## 2025-03-05 ENCOUNTER — P2P PATIENT RECORD (OUTPATIENT)
Age: 30
End: 2025-03-05

## 2025-03-10 ENCOUNTER — P2P PATIENT RECORD (OUTPATIENT)
Age: 30
End: 2025-03-10

## 2025-03-21 ENCOUNTER — OFFICE VISIT (OUTPATIENT)
Dept: URBAN - METROPOLITAN AREA CLINIC 77 | Facility: CLINIC | Age: 30
End: 2025-03-21
Payer: COMMERCIAL

## 2025-03-21 DIAGNOSIS — K51.90 ULCERATIVE COLITIS: ICD-10-CM

## 2025-03-21 PROCEDURE — 96413 CHEMO IV INFUSION 1 HR: CPT | Performed by: INTERNAL MEDICINE

## 2025-03-21 RX ORDER — PREDNISONE 10 MG/1
TAKE 4 TABLET BY ORAL ROUTE QD FOR 5 DAYS, THEN 3 TABLETS DAILY FOR 7 DAYS, THEN 2 TABLETS DAILY FOR 7 DAYS, THEN 1 TABLET DAILY FOR 4 DAYS THEN 0.5 TABLETS DAILY FOR 3 DAYS, THEN STOP TABLET ORAL 1
Qty: 70 | Refills: 0 | Status: ON HOLD | COMMUNITY
Start: 2019-03-26

## 2025-03-21 RX ORDER — ONDANSETRON 4 MG/1
1 TABLET ON THE TONGUE AND ALLOW TO DISSOLVE TABLET, ORALLY DISINTEGRATING ORAL
Qty: 30 TABLET | Refills: 0 | Status: ACTIVE | COMMUNITY
Start: 2024-08-20

## 2025-03-21 RX ORDER — MESALAMINE 1.2 G/1
TAKE 4 TABLETS BY MOUTH DAILY WITH MEAL TABLET, DELAYED RELEASE ORAL ONCE A DAY
Qty: 360 TABLET | Refills: 5 | Status: ACTIVE | COMMUNITY

## 2025-03-21 RX ORDER — OMEPRAZOLE 40 MG/1
1 CAPSULE 30 MINUTES BEFORE MORNING MEAL CAPSULE, DELAYED RELEASE ORAL ONCE A DAY
Qty: 90 | Refills: 3 | Status: ACTIVE | COMMUNITY
Start: 2024-08-20

## 2025-03-21 RX ORDER — OMEPRAZOLE 40 MG/1
1 CAPSULE 30 MINUTES BEFORE MORNING MEAL CAPSULE, DELAYED RELEASE ORAL ONCE A DAY
Qty: 30 | Status: ON HOLD | COMMUNITY
Start: 2023-11-06

## 2025-03-21 RX ORDER — OMEPRAZOLE 40 MG/1
TAKE 1 CAPSULE (40 MG) BY ORAL ROUTE ONCE DAILY 30 MINUTES BEFORE BREAKFAST CAPSULE, DELAYED RELEASE PELLETS ORAL 1
Qty: 30 | Refills: 4 | Status: ON HOLD | COMMUNITY
Start: 2019-10-16

## 2025-03-21 RX ORDER — ADALIMUMAB 40MG/0.4ML
INJECT 40MG SUBCUTANEOUSLY  WEEKLY KIT SUBCUTANEOUS
Qty: 4 EACH | Refills: 0 | Status: ACTIVE | COMMUNITY

## 2025-03-21 RX ORDER — ONDANSETRON HYDROCHLORIDE 4 MG/1
1 TABLET TABLET, FILM COATED ORAL ONCE A DAY
Qty: 30 | Status: ACTIVE | COMMUNITY
Start: 2024-08-05

## 2025-03-21 RX ORDER — SODIUM, POTASSIUM,MAG SULFATES 17.5-3.13G
354 ML SOLUTION, RECONSTITUTED, ORAL ORAL
Qty: 1 | Refills: 0 | Status: ON HOLD | COMMUNITY
Start: 2020-09-03

## 2025-03-21 RX ORDER — GUSELKUMAB 200 MG/2ML
2 ML INJECTION SUBCUTANEOUS
Qty: 2 PEN NEEDLE | Refills: 3 | Status: ACTIVE | COMMUNITY
Start: 2025-01-17 | End: 2025-05-17

## 2025-03-21 RX ORDER — METHYLPREDNISOLONE 4 MG/1
TAKE AS DIRECTED FOR 6 DAYS TABLET ORAL
Qty: 1 | Refills: 0 | Status: ON HOLD | COMMUNITY
Start: 2018-02-15

## 2025-04-18 ENCOUNTER — OFFICE VISIT (OUTPATIENT)
Dept: URBAN - METROPOLITAN AREA CLINIC 77 | Facility: CLINIC | Age: 30
End: 2025-04-18
Payer: COMMERCIAL

## 2025-04-18 DIAGNOSIS — K51.90 ULCERATIVE COLITIS: ICD-10-CM

## 2025-04-18 PROCEDURE — 96413 CHEMO IV INFUSION 1 HR: CPT | Performed by: INTERNAL MEDICINE

## 2025-04-18 RX ORDER — GUSELKUMAB 200 MG/2ML
2 ML INJECTION SUBCUTANEOUS
Qty: 2 PEN NEEDLE | Refills: 3 | Status: ACTIVE | COMMUNITY
Start: 2025-01-17 | End: 2025-05-17

## 2025-04-18 RX ORDER — MESALAMINE 1.2 G/1
TAKE 4 TABLETS BY MOUTH DAILY WITH MEAL TABLET, DELAYED RELEASE ORAL ONCE A DAY
Qty: 360 TABLET | Refills: 5 | Status: ACTIVE | COMMUNITY

## 2025-04-18 RX ORDER — METHYLPREDNISOLONE 4 MG/1
TAKE AS DIRECTED FOR 6 DAYS TABLET ORAL
Qty: 1 | Refills: 0 | Status: ON HOLD | COMMUNITY
Start: 2018-02-15

## 2025-04-18 RX ORDER — OMEPRAZOLE 40 MG/1
1 CAPSULE 30 MINUTES BEFORE MORNING MEAL CAPSULE, DELAYED RELEASE ORAL ONCE A DAY
Qty: 30 | Status: ON HOLD | COMMUNITY
Start: 2023-11-06

## 2025-04-18 RX ORDER — ADALIMUMAB 40MG/0.4ML
INJECT 40MG SUBCUTANEOUSLY  WEEKLY KIT SUBCUTANEOUS
Qty: 4 EACH | Refills: 0 | Status: ACTIVE | COMMUNITY

## 2025-04-18 RX ORDER — OMEPRAZOLE 40 MG/1
1 CAPSULE 30 MINUTES BEFORE MORNING MEAL CAPSULE, DELAYED RELEASE ORAL ONCE A DAY
Qty: 90 | Refills: 3 | Status: ACTIVE | COMMUNITY
Start: 2024-08-20

## 2025-04-18 RX ORDER — SODIUM, POTASSIUM,MAG SULFATES 17.5-3.13G
354 ML SOLUTION, RECONSTITUTED, ORAL ORAL
Qty: 1 | Refills: 0 | Status: ON HOLD | COMMUNITY
Start: 2020-09-03

## 2025-04-18 RX ORDER — PREDNISONE 10 MG/1
TAKE 4 TABLET BY ORAL ROUTE QD FOR 5 DAYS, THEN 3 TABLETS DAILY FOR 7 DAYS, THEN 2 TABLETS DAILY FOR 7 DAYS, THEN 1 TABLET DAILY FOR 4 DAYS THEN 0.5 TABLETS DAILY FOR 3 DAYS, THEN STOP TABLET ORAL 1
Qty: 70 | Refills: 0 | Status: ON HOLD | COMMUNITY
Start: 2019-03-26

## 2025-04-18 RX ORDER — OMEPRAZOLE 40 MG/1
TAKE 1 CAPSULE (40 MG) BY ORAL ROUTE ONCE DAILY 30 MINUTES BEFORE BREAKFAST CAPSULE, DELAYED RELEASE PELLETS ORAL 1
Qty: 30 | Refills: 4 | Status: ON HOLD | COMMUNITY
Start: 2019-10-16

## 2025-04-18 RX ORDER — ONDANSETRON 4 MG/1
1 TABLET ON THE TONGUE AND ALLOW TO DISSOLVE TABLET, ORALLY DISINTEGRATING ORAL
Qty: 30 TABLET | Refills: 0 | Status: ACTIVE | COMMUNITY
Start: 2024-08-20

## 2025-04-18 RX ORDER — ONDANSETRON HYDROCHLORIDE 4 MG/1
1 TABLET TABLET, FILM COATED ORAL ONCE A DAY
Qty: 30 | Status: ACTIVE | COMMUNITY
Start: 2024-08-05

## 2025-04-22 ENCOUNTER — OFFICE VISIT (OUTPATIENT)
Dept: URBAN - METROPOLITAN AREA CLINIC 78 | Facility: CLINIC | Age: 30
End: 2025-04-22
Payer: COMMERCIAL

## 2025-04-22 DIAGNOSIS — K51.90 ULCERATIVE COLITIS: ICD-10-CM

## 2025-04-22 PROCEDURE — 99213 OFFICE O/P EST LOW 20 MIN: CPT

## 2025-04-22 RX ORDER — ONDANSETRON HYDROCHLORIDE 4 MG/1
1 TABLET TABLET, FILM COATED ORAL ONCE A DAY
Qty: 30 | Status: ACTIVE | COMMUNITY
Start: 2024-08-05

## 2025-04-22 RX ORDER — GUSELKUMAB 200 MG/2ML
2 ML INJECTION SUBCUTANEOUS
Qty: 2 PEN NEEDLE | Refills: 3 | Status: ACTIVE | COMMUNITY
Start: 2025-01-17 | End: 2025-05-17

## 2025-04-22 RX ORDER — METHYLPREDNISOLONE 4 MG/1
TAKE AS DIRECTED FOR 6 DAYS TABLET ORAL
Qty: 1 | Refills: 0 | Status: ON HOLD | COMMUNITY
Start: 2018-02-15

## 2025-04-22 RX ORDER — ADALIMUMAB 40MG/0.4ML
INJECT 40MG SUBCUTANEOUSLY  WEEKLY KIT SUBCUTANEOUS
Qty: 4 EACH | Refills: 0 | Status: ACTIVE | COMMUNITY

## 2025-04-22 RX ORDER — ONDANSETRON 4 MG/1
1 TABLET ON THE TONGUE AND ALLOW TO DISSOLVE TABLET, ORALLY DISINTEGRATING ORAL
Qty: 30 TABLET | Refills: 0 | Status: ACTIVE | COMMUNITY
Start: 2024-08-20

## 2025-04-22 RX ORDER — OMEPRAZOLE 40 MG/1
TAKE 1 CAPSULE (40 MG) BY ORAL ROUTE ONCE DAILY 30 MINUTES BEFORE BREAKFAST CAPSULE, DELAYED RELEASE PELLETS ORAL 1
Qty: 30 | Refills: 4 | Status: ON HOLD | COMMUNITY
Start: 2019-10-16

## 2025-04-22 RX ORDER — OMEPRAZOLE 40 MG/1
1 CAPSULE 30 MINUTES BEFORE MORNING MEAL CAPSULE, DELAYED RELEASE ORAL ONCE A DAY
Qty: 90 | Refills: 3 | Status: ACTIVE | COMMUNITY
Start: 2024-08-20

## 2025-04-22 RX ORDER — PREDNISONE 10 MG/1
TAKE 4 TABLET BY ORAL ROUTE QD FOR 5 DAYS, THEN 3 TABLETS DAILY FOR 7 DAYS, THEN 2 TABLETS DAILY FOR 7 DAYS, THEN 1 TABLET DAILY FOR 4 DAYS THEN 0.5 TABLETS DAILY FOR 3 DAYS, THEN STOP TABLET ORAL 1
Qty: 70 | Refills: 0 | Status: ON HOLD | COMMUNITY
Start: 2019-03-26

## 2025-04-22 RX ORDER — OMEPRAZOLE 40 MG/1
1 CAPSULE 30 MINUTES BEFORE MORNING MEAL CAPSULE, DELAYED RELEASE ORAL ONCE A DAY
Qty: 30 | Status: ON HOLD | COMMUNITY
Start: 2023-11-06

## 2025-04-22 RX ORDER — MESALAMINE 1.2 G/1
TAKE 4 TABLETS BY MOUTH DAILY WITH MEAL TABLET, DELAYED RELEASE ORAL ONCE A DAY
Qty: 360 TABLET | Refills: 5 | Status: ACTIVE | COMMUNITY

## 2025-04-22 RX ORDER — SODIUM, POTASSIUM,MAG SULFATES 17.5-3.13G
354 ML SOLUTION, RECONSTITUTED, ORAL ORAL
Qty: 1 | Refills: 0 | Status: ON HOLD | COMMUNITY
Start: 2020-09-03

## 2025-04-22 NOTE — HPI-TODAY'S VISIT:
29-year-old male, established patient, last seen in January 2025 by Dr. Arreguin for continued care and ulcerative colitis.     He was started on Tremfeya ( 1st induction on 2/21/25)  and advised to c/w mesalamine 1.2 GM 3 tablets QAM He is on omeprazole 40mg prn for GERD.  He does not really take it that much anymore. He deny N/ V/GERD/ dysphagia/ abn weight loss/ abd pain.  He has regular BMs, no blood or mucus in the stool  He plans to start his [id number] Tremfeya injections on May 16th.  He is still taking mesalamine 3 tablets QAM. He denies any fever or chills.     Summary of prior workup: - EGDColonoscopy on12/13/2024: Normal esophagus (bxs with reflux type changes). Regular Z-line, normal mucosa throughout the stomach (no H pylori). Normal duodenum (biopsies unremarkable). Colonoscopy with moderate pancolitis (bxs consistent with diffuse chronic active colitis, no infection, dysplasia or malignancy). 2 diminutive inflammatory polyps in the transverse and 3 diminutive inflammatory pseudopolyps in the splenic flexure. Repeat colonoscopy was advised in 1 year. - E/C on 12/13/2024: Normal upper GI tract. Biopsies consistent with reflux type changes, chronic inactive gastritis and normal duodenum. On colonoscopy there was evidence of moderate pancolitis as well as a few inflammatory pseudopolyps in the transverse and splenic flexure. Biopsies throughout the colon showed diffuse chronic active colitis throughout. - Labs on 9/14/2024: WBC 13.8, hemoglobin 12.9, MCV 88, platelets 413. TB Gold test negative blood culture no growth.  - Colonoscopy by me on 12/15/23: Moderate pancolonic colitis (biopsies confirmed diffuse active colitis) as well as a 4mm desc and 4mm sigm inflammatory pseudopolyp. Repeat colonoscopy advised in 1 year. - GI PCR panel on 11/21/23: negative for any pathogens. Ova and parasites were also negative as was Cyclospora, Cryptosporidium, Isospora and Microsporidia. Fecal inflammatory marker was quite high however at 640. - Colonoscopy by me on 1/18/2023: Normal terminal ileum, diffuse area of mild erythema extending from the transverse to the ascending colon; biopsies consistent with chronic inactive colitis, no granulomas or dysplasia. Mild erythema also noted in the descending and splenic flexure; biopsies consistent with chronic inactive colitis, no granulomas or dysplasia. Moderate nodular mucosa found in the sigmoid colon, conssitent with hyperplastic changes. Normal mucosa in the rectum, biopsies unremarkable. - Labs on 4/18/2022: Sodium 139, potassium 3.9, BUN 10, creatinine 0.7, calcium 9.4, total bilirubin 0.6, alkaline phosphatase 84, total protein 8.0, hemoglobin A1c 5.5%. AST 21, ALT 27, glucose 86, TSH 2.43. WBC 11.9, hemoglobin 17.7, MCV 88, platelets 332. UA negative except for trace blood. - Labs on 6/7/2021 revealed a negative QuantiFERON TB Gold test. Glucose 98, BUN 11, creatinine 0.7, sodium 141, potassium 4.5, calcium 9.5, total protein 7.9, albumin 4.9, total bilirubin 0.6, alkaline phosphatase 99, AST 17, ALT 26. Acute hepatitis panel negative. WBC 8.2, hemoglobin 17.4, MCV 90, platelets 322. - Labs on 12/3/20: Na 139, K 4.0, Cl 101, Gluc 87, BUN 15, C r 0.9, TP 7.9, Alb 4.6, TB 0.7, Ca 9.7, ALT 33, AST 21, AP 90. HbA1c 5.4%, , TSH 1.28, WBC 9.3, Hb 17.2, MCV 90, Plts 327. - Colonoscopy by me on 11/4/20: Normal mucosa thorughout the colon except for mild patchy erythema in the sigm and rectum, as well as a small inflammatory pseudopolyp removed from the rectum. Bxs consistent with inactive colitis. - Bloodwork on 4/18/20 revealed a glucose of 97, BUN 11, creatinine 0.8, sodium 142, potassium 4.2, calcium 9.7, TP 7.9, albumin 4.9, TB 0.7, AP slightly elevated at 123, AST 29, ALT 29. WBC 8.9, hemoglobin 17.6, MCV 89, platelets 354, % saturation 31, normal Fe, negative viral hepatitis panel, negative TB test. Vitamin D low at 17.9. B12 323, ferritin 70, CRP 2. - Bloodwork on 1/17/20 revealed an Adlimumab level of 3.2 while Adalimumab antibody was undetectable. - Colonoscopy by me on 12/11/19 revealed a normal terminal ileum and overall normal mucosa throughout the descending, splenic flexure, transverse, hepatic flexure, ascending and cecum. Biopsies were consistent with ongoing mild active colitis. There was evidence of proctosigmoiditis and a few polyps which were noted to be inflammatory and hyperplastic in nature. - Labs on 7/27/19 revealed a glucose of 96, BUN 11, creatinine 0.7, normal electrolytes except for a CO2 of 19. Calcium 9.6, TP 8.0, albumin 4.8, TB 0.9, AP 85, AST 14, ALT 22. WBC 7.2, hemoglobin 17.1, MCV 95, platelets 275. TB negative. ESR 23. CRP 4. Negtive UA on 7/19/19. - Labs on 3/26/19 revealed a normal CMP &CBC, negative hepatitis panel, QuantiFERON-TB gold test could not be run as the sample was insufficient. TSH 2.57. Positive immunity to hepatitis B but not to hepatitis A. CRP normal at 4.3 however ESR was elevated at 20. - GI PCR panel on 3/11/19 was negative. - Colonoscopy by me on 12/26/18 revealed a normal appearing mucosa throughout without erythema, ulcerations or blood. Random biopsies were consistent with chronic inactive colitis. Around the appendiceal orifice there was some nodularity. Biopsies were unremarkable. There were 2 polyps in the descending colon and 2 polyps in the sigmoid. Path results were consistent with lymphoid aggreagates and HP polyps. - Labs on 10/1/18 revealed his sodium of 141, potassium 3.9, glucose 79, be you and 16, creatinine 0.8, TP 8.2, albumin 4.9, tea be sure. Seven, ALT 26, AP 83, AST 16, HbA1c 5%. Triglycerides 132, LDL 41, HDL 38. TSH 1.8. White blood cell count 6.9, hemoglobin 16.6, MCV 98, platelet count 322,000. UA On 8/10/18 was negative. - GI PCR panel on 6/27/18 was negative. - C diff negative on 2/27/18. - Colonoscopy by Dr. Montesinos on 12/11/17 showed mild pancolitis extending from the TI to the rectum. TI biopsies were normal. Random colon biopsies revealed mild focal active colitis. - Labs on 11/4/17 disclosed a normal CBC except for an MCV of 99, normal CMP and TPMT of 20.5.

## 2025-06-03 ENCOUNTER — OFFICE VISIT (OUTPATIENT)
Dept: URBAN - METROPOLITAN AREA CLINIC 78 | Facility: CLINIC | Age: 30
End: 2025-06-03
Payer: COMMERCIAL

## 2025-06-03 DIAGNOSIS — K51.90 ULCERATIVE COLITIS: ICD-10-CM

## 2025-06-03 PROCEDURE — 99213 OFFICE O/P EST LOW 20 MIN: CPT

## 2025-06-03 RX ORDER — SODIUM, POTASSIUM,MAG SULFATES 17.5-3.13G
354 ML SOLUTION, RECONSTITUTED, ORAL ORAL
Qty: 1 | Refills: 0 | Status: ON HOLD | COMMUNITY
Start: 2020-09-03

## 2025-06-03 RX ORDER — OMEPRAZOLE 40 MG/1
1 CAPSULE 30 MINUTES BEFORE MORNING MEAL CAPSULE, DELAYED RELEASE ORAL ONCE A DAY
Qty: 90 | Refills: 3 | Status: ACTIVE | COMMUNITY
Start: 2024-08-20

## 2025-06-03 RX ORDER — MESALAMINE 1.2 G/1
TAKE 4 TABLETS BY MOUTH DAILY WITH MEAL TABLET, DELAYED RELEASE ORAL ONCE A DAY
Qty: 360 TABLET | Refills: 5 | Status: ACTIVE | COMMUNITY

## 2025-06-03 RX ORDER — METHYLPREDNISOLONE 4 MG/1
TAKE AS DIRECTED FOR 6 DAYS TABLET ORAL
Qty: 1 | Refills: 0 | Status: ON HOLD | COMMUNITY
Start: 2018-02-15

## 2025-06-03 RX ORDER — PREDNISONE 10 MG/1
TAKE 4 TABLET BY ORAL ROUTE QD FOR 5 DAYS, THEN 3 TABLETS DAILY FOR 7 DAYS, THEN 2 TABLETS DAILY FOR 7 DAYS, THEN 1 TABLET DAILY FOR 4 DAYS THEN 0.5 TABLETS DAILY FOR 3 DAYS, THEN STOP TABLET ORAL 1
Qty: 70 | Refills: 0 | Status: ON HOLD | COMMUNITY
Start: 2019-03-26

## 2025-06-03 RX ORDER — OMEPRAZOLE 40 MG/1
TAKE 1 CAPSULE (40 MG) BY ORAL ROUTE ONCE DAILY 30 MINUTES BEFORE BREAKFAST CAPSULE, DELAYED RELEASE PELLETS ORAL 1
Qty: 30 | Refills: 4 | Status: ON HOLD | COMMUNITY
Start: 2019-10-16

## 2025-06-03 RX ORDER — OMEPRAZOLE 40 MG/1
1 CAPSULE 30 MINUTES BEFORE MORNING MEAL CAPSULE, DELAYED RELEASE ORAL ONCE A DAY
Qty: 30 | Status: ON HOLD | COMMUNITY
Start: 2023-11-06

## 2025-06-03 NOTE — HPI-TODAY'S VISIT:
30 y.o M, est pt, presents for continued care of of U.C on Tremfya  He deny F/C N/ V/GERD/ dysphagia/ abn weight loss/ abd pain.  He have regular BMs, no blood or mucus in the stool  He do not see a cardio or pulm dr.  He deny use of BT/NSAID/GLP1.  He denies CP/SOB.

## 2025-06-04 LAB
ALBUMIN: 4.3
ALKALINE PHOSPHATASE: 119
ALT (SGPT): 23
AST (SGOT): 18
BASO (ABSOLUTE): 0.1
BASOS: 1
BILIRUBIN, TOTAL: 0.2
BUN/CREATININE RATIO: 13
BUN: 12
CALCIUM: 9.5
CARBON DIOXIDE, TOTAL: 22
CHLORIDE: 100
CREATININE: 0.94
EGFR: 112
EOS (ABSOLUTE): 0.4
EOS: 3
GLOBULIN, TOTAL: 3.1
GLUCOSE: 91
HEMATOCRIT: 47.6
HEMOGLOBIN: 15.2
IMMATURE GRANS (ABS): 0
IMMATURE GRANULOCYTES: 0
LYMPHS (ABSOLUTE): 5.1
LYMPHS: 42
MCH: 26.8
MCHC: 31.9
MCV: 84
MONOCYTES(ABSOLUTE): 0.9
MONOCYTES: 7
NEUTROPHILS (ABSOLUTE): 5.7
NEUTROPHILS: 47
PLATELETS: 320
POTASSIUM: 4.3
PROTEIN, TOTAL: 7.4
RBC: 5.68
RDW: 14.7
SODIUM: 137
WBC: 12.2

## 2025-06-24 ENCOUNTER — OFFICE VISIT (OUTPATIENT)
Dept: URBAN - METROPOLITAN AREA CLINIC 77 | Facility: CLINIC | Age: 30
End: 2025-06-24

## 2025-07-21 ENCOUNTER — P2P PATIENT RECORD (OUTPATIENT)
Age: 30
End: 2025-07-21